# Patient Record
Sex: FEMALE | Race: WHITE | Employment: UNEMPLOYED | URBAN - METROPOLITAN AREA
[De-identification: names, ages, dates, MRNs, and addresses within clinical notes are randomized per-mention and may not be internally consistent; named-entity substitution may affect disease eponyms.]

---

## 2023-05-22 ENCOUNTER — APPOINTMENT (OUTPATIENT)
Dept: CT IMAGING | Age: 33
End: 2023-05-22
Payer: COMMERCIAL

## 2023-05-22 ENCOUNTER — APPOINTMENT (OUTPATIENT)
Dept: GENERAL RADIOLOGY | Age: 33
End: 2023-05-22
Payer: COMMERCIAL

## 2023-05-22 ENCOUNTER — HOSPITAL ENCOUNTER (EMERGENCY)
Age: 33
Discharge: HOME | End: 2023-05-22
Attending: EMERGENCY MEDICINE
Payer: COMMERCIAL

## 2023-05-22 VITALS
SYSTOLIC BLOOD PRESSURE: 107 MMHG | RESPIRATION RATE: 16 BRPM | BODY MASS INDEX: 28.32 KG/M2 | HEIGHT: 65 IN | TEMPERATURE: 97.7 F | OXYGEN SATURATION: 93 % | DIASTOLIC BLOOD PRESSURE: 69 MMHG | HEART RATE: 70 BPM | WEIGHT: 170 LBS

## 2023-05-22 DIAGNOSIS — I95.1 ORTHOSTASIS: ICD-10-CM

## 2023-05-22 DIAGNOSIS — Z76.0 ENCOUNTER FOR MEDICATION REFILL: ICD-10-CM

## 2023-05-22 DIAGNOSIS — R07.9 CHEST PAIN, UNSPECIFIED TYPE: Primary | ICD-10-CM

## 2023-05-22 LAB
ALBUMIN SERPL-MCNC: 4.1 G/DL (ref 3.4–5)
ALBUMIN/GLOB SERPL: 1.5 {RATIO} (ref 1.1–2.2)
ALP SERPL-CCNC: 56 U/L (ref 40–129)
ALT SERPL-CCNC: 11 U/L (ref 10–40)
ANION GAP SERPL CALCULATED.3IONS-SCNC: 10 MMOL/L (ref 3–16)
AST SERPL-CCNC: 13 U/L (ref 15–37)
BASOPHILS # BLD: 0 K/UL (ref 0–0.2)
BASOPHILS NFR BLD: 0.5 %
BILIRUB SERPL-MCNC: 0.4 MG/DL (ref 0–1)
BUN SERPL-MCNC: 11 MG/DL (ref 7–20)
CALCIUM SERPL-MCNC: 9.3 MG/DL (ref 8.3–10.6)
CHLORIDE SERPL-SCNC: 104 MMOL/L (ref 99–110)
CO2 SERPL-SCNC: 25 MMOL/L (ref 21–32)
CREAT SERPL-MCNC: 0.6 MG/DL (ref 0.6–1.1)
DEPRECATED RDW RBC AUTO: 12.8 % (ref 12.4–15.4)
EKG ATRIAL RATE: 90 BPM
EKG DIAGNOSIS: NORMAL
EKG P AXIS: 50 DEGREES
EKG P-R INTERVAL: 146 MS
EKG Q-T INTERVAL: 362 MS
EKG QRS DURATION: 82 MS
EKG QTC CALCULATION (BAZETT): 442 MS
EKG R AXIS: 58 DEGREES
EKG T AXIS: 55 DEGREES
EKG VENTRICULAR RATE: 90 BPM
EOSINOPHIL # BLD: 0.2 K/UL (ref 0–0.6)
EOSINOPHIL NFR BLD: 3.2 %
GFR SERPLBLD CREATININE-BSD FMLA CKD-EPI: >60 ML/MIN/{1.73_M2}
GLUCOSE SERPL-MCNC: 93 MG/DL (ref 70–99)
HCG SERPL QL: NEGATIVE
HCT VFR BLD AUTO: 43.5 % (ref 36–48)
HGB BLD-MCNC: 15 G/DL (ref 12–16)
INR PPP: 0.97 (ref 0.84–1.16)
LYMPHOCYTES # BLD: 1.7 K/UL (ref 1–5.1)
LYMPHOCYTES NFR BLD: 34.1 %
MCH RBC QN AUTO: 31.1 PG (ref 26–34)
MCHC RBC AUTO-ENTMCNC: 34.4 G/DL (ref 31–36)
MCV RBC AUTO: 90.4 FL (ref 80–100)
MONOCYTES # BLD: 0.5 K/UL (ref 0–1.3)
MONOCYTES NFR BLD: 10.8 %
NEUTROPHILS # BLD: 2.6 K/UL (ref 1.7–7.7)
NEUTROPHILS NFR BLD: 51.4 %
NT-PROBNP SERPL-MCNC: <36 PG/ML (ref 0–124)
PLATELET # BLD AUTO: 231 K/UL (ref 135–450)
PMV BLD AUTO: 9 FL (ref 5–10.5)
POTASSIUM SERPL-SCNC: 3.7 MMOL/L (ref 3.5–5.1)
PROT SERPL-MCNC: 6.8 G/DL (ref 6.4–8.2)
PROTHROMBIN TIME: 12.9 SEC (ref 11.5–14.8)
RBC # BLD AUTO: 4.81 M/UL (ref 4–5.2)
SODIUM SERPL-SCNC: 139 MMOL/L (ref 136–145)
TROPONIN, HIGH SENSITIVITY: <6 NG/L (ref 0–14)
TROPONIN, HIGH SENSITIVITY: <6 NG/L (ref 0–14)
WBC # BLD AUTO: 5 K/UL (ref 4–11)

## 2023-05-22 PROCEDURE — 2580000003 HC RX 258: Performed by: EMERGENCY MEDICINE

## 2023-05-22 PROCEDURE — 85610 PROTHROMBIN TIME: CPT

## 2023-05-22 PROCEDURE — 93010 ELECTROCARDIOGRAM REPORT: CPT | Performed by: INTERNAL MEDICINE

## 2023-05-22 PROCEDURE — 6370000000 HC RX 637 (ALT 250 FOR IP): Performed by: EMERGENCY MEDICINE

## 2023-05-22 PROCEDURE — 6370000000 HC RX 637 (ALT 250 FOR IP): Performed by: REGISTERED NURSE

## 2023-05-22 PROCEDURE — 71045 X-RAY EXAM CHEST 1 VIEW: CPT

## 2023-05-22 PROCEDURE — 93005 ELECTROCARDIOGRAM TRACING: CPT | Performed by: EMERGENCY MEDICINE

## 2023-05-22 PROCEDURE — 71260 CT THORAX DX C+: CPT

## 2023-05-22 PROCEDURE — 2580000003 HC RX 258: Performed by: REGISTERED NURSE

## 2023-05-22 PROCEDURE — 83880 ASSAY OF NATRIURETIC PEPTIDE: CPT

## 2023-05-22 PROCEDURE — 36415 COLL VENOUS BLD VENIPUNCTURE: CPT

## 2023-05-22 PROCEDURE — 6360000004 HC RX CONTRAST MEDICATION: Performed by: REGISTERED NURSE

## 2023-05-22 PROCEDURE — 99285 EMERGENCY DEPT VISIT HI MDM: CPT

## 2023-05-22 PROCEDURE — 84703 CHORIONIC GONADOTROPIN ASSAY: CPT

## 2023-05-22 PROCEDURE — 70450 CT HEAD/BRAIN W/O DYE: CPT

## 2023-05-22 PROCEDURE — 85025 COMPLETE CBC W/AUTO DIFF WBC: CPT

## 2023-05-22 PROCEDURE — 80053 COMPREHEN METABOLIC PANEL: CPT

## 2023-05-22 PROCEDURE — 84484 ASSAY OF TROPONIN QUANT: CPT

## 2023-05-22 RX ORDER — RANOLAZINE 1000 MG/1
1000 TABLET, EXTENDED RELEASE ORAL 2 TIMES DAILY
Qty: 60 TABLET | Refills: 0 | Status: SHIPPED | OUTPATIENT
Start: 2023-05-22 | End: 2023-06-21

## 2023-05-22 RX ORDER — ISOSORBIDE MONONITRATE 30 MG/1
30 TABLET, EXTENDED RELEASE ORAL DAILY
Qty: 30 TABLET | Refills: 0 | Status: SHIPPED | OUTPATIENT
Start: 2023-05-22

## 2023-05-22 RX ORDER — ISOSORBIDE MONONITRATE 30 MG/1
30 TABLET, EXTENDED RELEASE ORAL DAILY
Status: DISCONTINUED | OUTPATIENT
Start: 2023-05-22 | End: 2023-05-22 | Stop reason: HOSPADM

## 2023-05-22 RX ORDER — MECLIZINE HCL 25MG 25 MG/1
25 TABLET, CHEWABLE ORAL ONCE
Status: COMPLETED | OUTPATIENT
Start: 2023-05-22 | End: 2023-05-22

## 2023-05-22 RX ORDER — SODIUM CHLORIDE, SODIUM LACTATE, POTASSIUM CHLORIDE, AND CALCIUM CHLORIDE .6; .31; .03; .02 G/100ML; G/100ML; G/100ML; G/100ML
1000 INJECTION, SOLUTION INTRAVENOUS ONCE
Status: COMPLETED | OUTPATIENT
Start: 2023-05-22 | End: 2023-05-22

## 2023-05-22 RX ORDER — NITROGLYCERIN 0.4 MG/1
0.4 TABLET SUBLINGUAL EVERY 5 MIN PRN
Status: DISCONTINUED | OUTPATIENT
Start: 2023-05-22 | End: 2023-05-22 | Stop reason: HOSPADM

## 2023-05-22 RX ORDER — 0.9 % SODIUM CHLORIDE 0.9 %
1000 INTRAVENOUS SOLUTION INTRAVENOUS ONCE
Status: COMPLETED | OUTPATIENT
Start: 2023-05-22 | End: 2023-05-22

## 2023-05-22 RX ORDER — RANOLAZINE 500 MG/1
1000 TABLET, EXTENDED RELEASE ORAL 2 TIMES DAILY
Status: DISCONTINUED | OUTPATIENT
Start: 2023-05-22 | End: 2023-05-22 | Stop reason: HOSPADM

## 2023-05-22 RX ADMIN — NITROGLYCERIN 0.4 MG: 0.4 TABLET SUBLINGUAL at 13:41

## 2023-05-22 RX ADMIN — SODIUM CHLORIDE, POTASSIUM CHLORIDE, SODIUM LACTATE AND CALCIUM CHLORIDE 1000 ML: 600; 310; 30; 20 INJECTION, SOLUTION INTRAVENOUS at 16:12

## 2023-05-22 RX ADMIN — MECLIZINE HYDROCHLORIDE 25 MG: 25 TABLET, CHEWABLE ORAL at 13:41

## 2023-05-22 RX ADMIN — SODIUM CHLORIDE 1000 ML: 9 INJECTION, SOLUTION INTRAVENOUS at 13:44

## 2023-05-22 RX ADMIN — ISOSORBIDE MONONITRATE 30 MG: 30 TABLET, EXTENDED RELEASE ORAL at 16:10

## 2023-05-22 RX ADMIN — IOPAMIDOL 75 ML: 755 INJECTION, SOLUTION INTRAVENOUS at 12:08

## 2023-05-22 ASSESSMENT — PAIN DESCRIPTION - LOCATION: LOCATION: CHEST

## 2023-05-22 ASSESSMENT — PAIN - FUNCTIONAL ASSESSMENT
PAIN_FUNCTIONAL_ASSESSMENT: NONE - DENIES PAIN
PAIN_FUNCTIONAL_ASSESSMENT: 0-10

## 2023-05-22 ASSESSMENT — PAIN DESCRIPTION - ORIENTATION: ORIENTATION: LEFT;UPPER

## 2023-05-22 ASSESSMENT — PAIN DESCRIPTION - ONSET: ONSET: ON-GOING

## 2023-05-22 ASSESSMENT — PAIN DESCRIPTION - FREQUENCY: FREQUENCY: CONTINUOUS

## 2023-05-22 ASSESSMENT — PAIN SCALES - GENERAL: PAINLEVEL_OUTOF10: 5

## 2023-05-22 ASSESSMENT — HEART SCORE: ECG: 0

## 2023-05-22 NOTE — ED PROVIDER NOTES
I independently performed a history and physical on [address]. All diagnostic, treatment, and disposition decisions were made by myself in conjunction with the advanced practice provider. For further details of [address] emergency department encounter, please see Em Butt NP's documentation. Chief Complaint   Patient presents with    Chest Pain     Reports strong cardiac history     Patient reports that she has a significant cardiac history involving a PFO as well as PDA. She also states that at one time she was prescribed Ranexa and Imdur by Dr. Merlene Ott with [address]. She however has stopped taking these medicines but feels she needs them again. She has been dizzy for the last day and having chest pain as well. On exam heart regular rate and rhythm and lungs clear to auscultation bilaterally. EKG  The Ekg interpreted by me shows  normal sinus rhythm with a rate of 90  Axis is   Normal  QTc is  normal  Intervals and Durations are unremarkable. ST Segments: normal  No significant change from prior EKG dated 4/24/2020    Labs Reviewed   COMPREHENSIVE METABOLIC PANEL W/ REFLEX TO MG FOR LOW K - Abnormal; Notable for the following components:       Result Value    AST 13 (*)     All other components within normal limits   CBC WITH AUTO DIFFERENTIAL   TROPONIN   BRAIN NATRIURETIC PEPTIDE   HCG, SERUM, QUALITATIVE   PROTIME-INR   TROPONIN   URINALYSIS WITH MICROSCOPIC   POCT GLUCOSE     CT CHEST PULMONARY EMBOLISM W CONTRAST   Final Result   1. No pulmonary embolism. 2. The lungs are clear with no acute finding in the chest.         CT HEAD WO CONTRAST   Preliminary Result   No evidence of acute intracranial abnormality. XR CHEST PORTABLE   Final Result   No acute cardiopulmonary findings             Point-of-care ultrasound performed by me:  No results found.     I personally saw this patient and performed a substantive portion of the visit including all aspects of
radiologist and myself are no evidence of acute intracranial abnormality. Chest x-ray interpreted by the radiologist and myself are no acute cardiopulmonary findings. Normal cardiomediastinal silhouette. No acute airspace infiltrate and no pneumothorax or pleural effusion. CT chest pulmonary embolism with contrast interpreted by the radiologist for no pulmonary emboli, lungs are clear without acute findings in the chest.  Heart size is normal.  Orthostatic vital signs were collected and revealed positive for orthostasis as evidenced by pulse lying at 67 elevated to 98 bpm while standing. Patient to receive IV fluids. I did place a consult to cardiology and spoke with Dr. Candelaria Lam because of patient's history of PFO and reports of chest pain. He felt that she was reasonable to follow-up as an outpatient as her heart score was low with 2 negative troponins. On reevaluation patient stated that she was feeling much improved after receiving IV fluids. At this time further evaluation in the emergency department is pending and thereby transferred to Dr. Renetta Chung will monitor further evaluation and determine final disposition. Is this patient to be included in the SEP-1 Core Measure due to severe sepsis or septic shock? No   Exclusion criteria - the patient is NOT to be included for SEP-1 Core Measure due to: Infection is not suspected    Discharge Time out:  CC Reviewed Yes   Test Results Yes     Vitals:    05/22/23 1900   BP: 107/69   Pulse: 70   Resp: 16   Temp:    SpO2: 93%              FINAL IMPRESSION      1. Chest pain, unspecified type    2. Orthostasis    3.  Encounter for medication refill          DISPOSITION/PLAN   DISPOSITION Decision To Discharge 05/22/2023 07:38:09 PM      PATIENT REFERREDTO:  Lonnie Sampson MD  [address]  [phone]    Schedule an appointment as soon as possible for a visit in 2 days      Therese Valencia MD  [facility]

## 2023-05-22 NOTE — ED NOTES
1435-Call placed to Cardiology for consult placed by Araseli Mckeon NP. Ouida Sandhoff  05/22/23 1437  1440-Call back received from Dr. Nick Appiah spoke with Araseli Mckeon NP regarding consult.       Ouida Sandhoff  05/22/23 1440

## 2023-05-25 ASSESSMENT — ENCOUNTER SYMPTOMS
CONSTIPATION: 0
SORE THROAT: 0
EYE PAIN: 0
VOMITING: 0
COUGH: 0
DIARRHEA: 0
CHEST TIGHTNESS: 1
ABDOMINAL PAIN: 0
RHINORRHEA: 0
NAUSEA: 0
SHORTNESS OF BREATH: 0
PHOTOPHOBIA: 0
BACK PAIN: 0

## 2023-06-17 ENCOUNTER — HOSPITAL ENCOUNTER (EMERGENCY)
Age: 33
Discharge: HOME | End: 2023-06-18
Attending: STUDENT IN AN ORGANIZED HEALTH CARE EDUCATION/TRAINING PROGRAM
Payer: COMMERCIAL

## 2023-06-17 ENCOUNTER — APPOINTMENT (OUTPATIENT)
Dept: GENERAL RADIOLOGY | Age: 33
End: 2023-06-17
Payer: COMMERCIAL

## 2023-06-17 DIAGNOSIS — R07.9 CHEST PAIN, UNSPECIFIED TYPE: Primary | ICD-10-CM

## 2023-06-17 PROCEDURE — 71045 X-RAY EXAM CHEST 1 VIEW: CPT

## 2023-06-17 PROCEDURE — 93005 ELECTROCARDIOGRAM TRACING: CPT | Performed by: STUDENT IN AN ORGANIZED HEALTH CARE EDUCATION/TRAINING PROGRAM

## 2023-06-17 PROCEDURE — 80053 COMPREHEN METABOLIC PANEL: CPT

## 2023-06-17 PROCEDURE — 99285 EMERGENCY DEPT VISIT HI MDM: CPT

## 2023-06-17 PROCEDURE — 6370000000 HC RX 637 (ALT 250 FOR IP): Performed by: STUDENT IN AN ORGANIZED HEALTH CARE EDUCATION/TRAINING PROGRAM

## 2023-06-17 PROCEDURE — 85025 COMPLETE CBC W/AUTO DIFF WBC: CPT

## 2023-06-17 PROCEDURE — 36415 COLL VENOUS BLD VENIPUNCTURE: CPT

## 2023-06-17 PROCEDURE — 84484 ASSAY OF TROPONIN QUANT: CPT

## 2023-06-17 PROCEDURE — 83880 ASSAY OF NATRIURETIC PEPTIDE: CPT

## 2023-06-17 RX ORDER — ISOSORBIDE MONONITRATE 30 MG/1
30 TABLET, EXTENDED RELEASE ORAL ONCE
Status: COMPLETED | OUTPATIENT
Start: 2023-06-17 | End: 2023-06-17

## 2023-06-17 RX ORDER — RANOLAZINE 500 MG/1
1000 TABLET, EXTENDED RELEASE ORAL ONCE
Status: COMPLETED | OUTPATIENT
Start: 2023-06-17 | End: 2023-06-17

## 2023-06-17 RX ADMIN — ISOSORBIDE MONONITRATE 30 MG: 30 TABLET, EXTENDED RELEASE ORAL at 23:36

## 2023-06-17 RX ADMIN — RANOLAZINE 1000 MG: 500 TABLET, FILM COATED, EXTENDED RELEASE ORAL at 23:36

## 2023-06-18 VITALS
WEIGHT: 182.2 LBS | SYSTOLIC BLOOD PRESSURE: 96 MMHG | DIASTOLIC BLOOD PRESSURE: 52 MMHG | RESPIRATION RATE: 21 BRPM | TEMPERATURE: 97.6 F | BODY MASS INDEX: 30.35 KG/M2 | HEART RATE: 82 BPM | HEIGHT: 65 IN | OXYGEN SATURATION: 96 %

## 2023-06-18 LAB
ALBUMIN SERPL-MCNC: 4.5 G/DL (ref 3.4–5)
ALBUMIN/GLOB SERPL: 1.5 {RATIO} (ref 1.1–2.2)
ALP SERPL-CCNC: 65 U/L (ref 40–129)
ALT SERPL-CCNC: 21 U/L (ref 10–40)
ANION GAP SERPL CALCULATED.3IONS-SCNC: 7 MMOL/L (ref 3–16)
AST SERPL-CCNC: 18 U/L (ref 15–37)
BASOPHILS # BLD: 0 K/UL (ref 0–0.2)
BASOPHILS NFR BLD: 0.3 %
BILIRUB SERPL-MCNC: <0.2 MG/DL (ref 0–1)
BUN SERPL-MCNC: 15 MG/DL (ref 7–20)
CALCIUM SERPL-MCNC: 9.7 MG/DL (ref 8.3–10.6)
CHLORIDE SERPL-SCNC: 99 MMOL/L (ref 99–110)
CO2 SERPL-SCNC: 27 MMOL/L (ref 21–32)
CREAT SERPL-MCNC: 0.7 MG/DL (ref 0.6–1.1)
DEPRECATED RDW RBC AUTO: 13.8 % (ref 12.4–15.4)
EKG ATRIAL RATE: 87 BPM
EKG DIAGNOSIS: NORMAL
EKG P AXIS: 52 DEGREES
EKG P-R INTERVAL: 152 MS
EKG Q-T INTERVAL: 356 MS
EKG QRS DURATION: 82 MS
EKG QTC CALCULATION (BAZETT): 428 MS
EKG R AXIS: 56 DEGREES
EKG T AXIS: 53 DEGREES
EKG VENTRICULAR RATE: 87 BPM
EOSINOPHIL # BLD: 0.2 K/UL (ref 0–0.6)
EOSINOPHIL NFR BLD: 2.6 %
GFR SERPLBLD CREATININE-BSD FMLA CKD-EPI: >60 ML/MIN/{1.73_M2}
GLUCOSE SERPL-MCNC: 96 MG/DL (ref 70–99)
HCT VFR BLD AUTO: 43.1 % (ref 36–48)
HGB BLD-MCNC: 14.8 G/DL (ref 12–16)
LYMPHOCYTES # BLD: 2.5 K/UL (ref 1–5.1)
LYMPHOCYTES NFR BLD: 30.1 %
MCH RBC QN AUTO: 31.2 PG (ref 26–34)
MCHC RBC AUTO-ENTMCNC: 34.3 G/DL (ref 31–36)
MCV RBC AUTO: 90.9 FL (ref 80–100)
MONOCYTES # BLD: 0.8 K/UL (ref 0–1.3)
MONOCYTES NFR BLD: 9.6 %
NEUTROPHILS # BLD: 4.9 K/UL (ref 1.7–7.7)
NEUTROPHILS NFR BLD: 57.4 %
NT-PROBNP SERPL-MCNC: <36 PG/ML (ref 0–124)
PLATELET # BLD AUTO: 260 K/UL (ref 135–450)
PMV BLD AUTO: 8.7 FL (ref 5–10.5)
POTASSIUM SERPL-SCNC: 3.9 MMOL/L (ref 3.5–5.1)
PROT SERPL-MCNC: 7.6 G/DL (ref 6.4–8.2)
RBC # BLD AUTO: 4.75 M/UL (ref 4–5.2)
SODIUM SERPL-SCNC: 133 MMOL/L (ref 136–145)
TROPONIN, HIGH SENSITIVITY: <6 NG/L (ref 0–14)
TROPONIN, HIGH SENSITIVITY: <6 NG/L (ref 0–14)
WBC # BLD AUTO: 8.5 K/UL (ref 4–11)

## 2023-06-18 PROCEDURE — 93010 ELECTROCARDIOGRAM REPORT: CPT | Performed by: INTERNAL MEDICINE

## 2023-06-18 PROCEDURE — 84484 ASSAY OF TROPONIN QUANT: CPT

## 2023-06-18 PROCEDURE — 36415 COLL VENOUS BLD VENIPUNCTURE: CPT

## 2023-06-18 RX ORDER — RANOLAZINE 1000 MG/1
1000 TABLET, EXTENDED RELEASE ORAL 2 TIMES DAILY
Qty: 60 TABLET | Refills: 0 | Status: SHIPPED | OUTPATIENT
Start: 2023-06-18 | End: 2023-07-18

## 2023-06-18 RX ORDER — ISOSORBIDE MONONITRATE 30 MG/1
30 TABLET, EXTENDED RELEASE ORAL DAILY
Qty: 30 TABLET | Refills: 0 | Status: SHIPPED | OUTPATIENT
Start: 2023-06-18 | End: 2023-07-18

## 2023-06-18 ASSESSMENT — HEART SCORE: ECG: 0

## 2023-06-18 ASSESSMENT — PAIN SCALES - GENERAL: PAINLEVEL_OUTOF10: 4

## 2023-06-18 ASSESSMENT — LIFESTYLE VARIABLES
HOW MANY STANDARD DRINKS CONTAINING ALCOHOL DO YOU HAVE ON A TYPICAL DAY: PATIENT DOES NOT DRINK
HOW OFTEN DO YOU HAVE A DRINK CONTAINING ALCOHOL: NEVER

## 2023-10-21 PROCEDURE — 99283 EMERGENCY DEPT VISIT LOW MDM: CPT

## 2023-10-22 ENCOUNTER — HOSPITAL ENCOUNTER (EMERGENCY)
Age: 33
Discharge: HOME | End: 2023-10-22
Attending: EMERGENCY MEDICINE
Payer: COMMERCIAL

## 2023-10-22 VITALS
WEIGHT: 176 LBS | BODY MASS INDEX: 30.05 KG/M2 | TEMPERATURE: 97.1 F | OXYGEN SATURATION: 98 % | DIASTOLIC BLOOD PRESSURE: 80 MMHG | SYSTOLIC BLOOD PRESSURE: 122 MMHG | RESPIRATION RATE: 18 BRPM | HEART RATE: 89 BPM | HEIGHT: 64 IN

## 2023-10-22 DIAGNOSIS — H60.311 ACUTE DIFFUSE OTITIS EXTERNA OF RIGHT EAR: Primary | ICD-10-CM

## 2023-10-22 DIAGNOSIS — J06.9 VIRAL URI: ICD-10-CM

## 2023-10-22 PROCEDURE — 6370000000 HC RX 637 (ALT 250 FOR IP): Performed by: EMERGENCY MEDICINE

## 2023-10-22 RX ORDER — FLUTICASONE PROPIONATE 50 MCG
2 SPRAY, SUSPENSION (ML) NASAL DAILY
Qty: 16 G | Refills: 0 | Status: SHIPPED | OUTPATIENT
Start: 2023-10-22

## 2023-10-22 RX ORDER — OXYMETAZOLINE HYDROCHLORIDE 0.05 G/100ML
2 SPRAY NASAL 2 TIMES DAILY
Qty: 2 ML | Refills: 0 | Status: SHIPPED | OUTPATIENT
Start: 2023-10-22 | End: 2023-10-27

## 2023-10-22 RX ORDER — LITHIUM CARBONATE 300 MG/1
300 CAPSULE ORAL
COMMUNITY
Start: 2023-02-27

## 2023-10-22 RX ORDER — CIPROFLOXACIN AND DEXAMETHASONE 3; 1 MG/ML; MG/ML
4 SUSPENSION/ DROPS AURICULAR (OTIC) ONCE
Status: COMPLETED | OUTPATIENT
Start: 2023-10-22 | End: 2023-10-22

## 2023-10-22 RX ORDER — CIPROFLOXACIN AND DEXAMETHASONE 3; 1 MG/ML; MG/ML
4 SUSPENSION/ DROPS AURICULAR (OTIC) 2 TIMES DAILY
Qty: 7.5 ML | Refills: 0 | Status: SHIPPED | OUTPATIENT
Start: 2023-10-22 | End: 2023-10-29

## 2023-10-22 RX ADMIN — CIPROFLOXACIN AND DEXAMETHASONE 4 DROP: 3; 1 SUSPENSION/ DROPS AURICULAR (OTIC) at 03:27

## 2023-10-22 ASSESSMENT — PAIN DESCRIPTION - LOCATION: LOCATION: EAR

## 2023-10-22 ASSESSMENT — PAIN DESCRIPTION - FREQUENCY: FREQUENCY: INTERMITTENT

## 2023-10-22 ASSESSMENT — PAIN - FUNCTIONAL ASSESSMENT
PAIN_FUNCTIONAL_ASSESSMENT: NONE - DENIES PAIN
PAIN_FUNCTIONAL_ASSESSMENT: ACTIVITIES ARE NOT PREVENTED
PAIN_FUNCTIONAL_ASSESSMENT: 0-10

## 2023-10-22 ASSESSMENT — PAIN DESCRIPTION - ORIENTATION: ORIENTATION: RIGHT

## 2023-10-22 ASSESSMENT — PAIN SCALES - GENERAL: PAINLEVEL_OUTOF10: 10

## 2023-10-23 ASSESSMENT — ENCOUNTER SYMPTOMS
ALLERGIC/IMMUNOLOGIC NEGATIVE: 1
GASTROINTESTINAL NEGATIVE: 1
RESPIRATORY NEGATIVE: 1
FACIAL SWELLING: 0
EYES NEGATIVE: 1
SINUS PRESSURE: 0
SINUS PAIN: 0
SORE THROAT: 0
RHINORRHEA: 1
VOICE CHANGE: 0
TROUBLE SWALLOWING: 0

## 2024-04-21 ENCOUNTER — HOSPITAL ENCOUNTER (EMERGENCY)
Age: 34
Discharge: HOME | End: 2024-04-21
Payer: COMMERCIAL

## 2024-04-21 VITALS
TEMPERATURE: 97 F | OXYGEN SATURATION: 98 % | BODY MASS INDEX: 29.64 KG/M2 | HEIGHT: 64 IN | HEART RATE: 80 BPM | SYSTOLIC BLOOD PRESSURE: 99 MMHG | DIASTOLIC BLOOD PRESSURE: 64 MMHG | WEIGHT: 173.6 LBS | RESPIRATION RATE: 18 BRPM

## 2024-04-21 DIAGNOSIS — G43.909 MIGRAINE WITHOUT STATUS MIGRAINOSUS, NOT INTRACTABLE, UNSPECIFIED MIGRAINE TYPE: Primary | ICD-10-CM

## 2024-04-21 PROCEDURE — 96372 THER/PROPH/DIAG INJ SC/IM: CPT

## 2024-04-21 PROCEDURE — 6360000002 HC RX W HCPCS: Performed by: PHYSICIAN ASSISTANT

## 2024-04-21 PROCEDURE — 2580000003 HC RX 258: Performed by: PHYSICIAN ASSISTANT

## 2024-04-21 PROCEDURE — 96375 TX/PRO/DX INJ NEW DRUG ADDON: CPT

## 2024-04-21 PROCEDURE — 96374 THER/PROPH/DIAG INJ IV PUSH: CPT

## 2024-04-21 PROCEDURE — 99284 EMERGENCY DEPT VISIT MOD MDM: CPT

## 2024-04-21 RX ORDER — DIPHENHYDRAMINE HYDROCHLORIDE 50 MG/ML
25 INJECTION INTRAMUSCULAR; INTRAVENOUS ONCE
Status: COMPLETED | OUTPATIENT
Start: 2024-04-21 | End: 2024-04-21

## 2024-04-21 RX ORDER — DEXAMETHASONE SODIUM PHOSPHATE 10 MG/ML
4 INJECTION, SOLUTION INTRAMUSCULAR; INTRAVENOUS ONCE
Status: COMPLETED | OUTPATIENT
Start: 2024-04-21 | End: 2024-04-21

## 2024-04-21 RX ORDER — ORPHENADRINE CITRATE 30 MG/ML
60 INJECTION INTRAMUSCULAR; INTRAVENOUS ONCE
Status: COMPLETED | OUTPATIENT
Start: 2024-04-21 | End: 2024-04-21

## 2024-04-21 RX ORDER — KETOROLAC TROMETHAMINE 15 MG/ML
15 INJECTION, SOLUTION INTRAMUSCULAR; INTRAVENOUS ONCE
Status: COMPLETED | OUTPATIENT
Start: 2024-04-21 | End: 2024-04-21

## 2024-04-21 RX ORDER — PROCHLORPERAZINE EDISYLATE 5 MG/ML
10 INJECTION INTRAMUSCULAR; INTRAVENOUS ONCE
Status: COMPLETED | OUTPATIENT
Start: 2024-04-21 | End: 2024-04-21

## 2024-04-21 RX ORDER — 0.9 % SODIUM CHLORIDE 0.9 %
1000 INTRAVENOUS SOLUTION INTRAVENOUS ONCE
Status: COMPLETED | OUTPATIENT
Start: 2024-04-21 | End: 2024-04-21

## 2024-04-21 RX ADMIN — SODIUM CHLORIDE 1000 ML: 9 INJECTION, SOLUTION INTRAVENOUS at 13:17

## 2024-04-21 RX ADMIN — PROCHLORPERAZINE EDISYLATE 10 MG: 5 INJECTION INTRAMUSCULAR; INTRAVENOUS at 13:19

## 2024-04-21 RX ADMIN — DIPHENHYDRAMINE HYDROCHLORIDE 25 MG: 50 INJECTION, SOLUTION INTRAMUSCULAR; INTRAVENOUS at 13:23

## 2024-04-21 RX ADMIN — ORPHENADRINE CITRATE 60 MG: 60 INJECTION INTRAMUSCULAR; INTRAVENOUS at 13:20

## 2024-04-21 RX ADMIN — DEXAMETHASONE SODIUM PHOSPHATE 4 MG: 10 INJECTION, SOLUTION INTRAMUSCULAR; INTRAVENOUS at 13:24

## 2024-04-21 RX ADMIN — KETOROLAC TROMETHAMINE 15 MG: 15 INJECTION, SOLUTION INTRAMUSCULAR; INTRAVENOUS at 13:22

## 2024-04-21 ASSESSMENT — PAIN DESCRIPTION - ORIENTATION: ORIENTATION: LEFT

## 2024-04-21 ASSESSMENT — PAIN DESCRIPTION - PAIN TYPE: TYPE: ACUTE PAIN

## 2024-04-21 ASSESSMENT — LIFESTYLE VARIABLES
HOW OFTEN DO YOU HAVE A DRINK CONTAINING ALCOHOL: NEVER
HOW MANY STANDARD DRINKS CONTAINING ALCOHOL DO YOU HAVE ON A TYPICAL DAY: PATIENT DOES NOT DRINK

## 2024-04-21 ASSESSMENT — PAIN DESCRIPTION - LOCATION
LOCATION: HEAD
LOCATION: HEAD

## 2024-04-21 ASSESSMENT — PAIN DESCRIPTION - DESCRIPTORS: DESCRIPTORS: THROBBING

## 2024-04-21 ASSESSMENT — PAIN SCALES - GENERAL
PAINLEVEL_OUTOF10: 10
PAINLEVEL_OUTOF10: 4

## 2024-04-21 ASSESSMENT — PAIN - FUNCTIONAL ASSESSMENT
PAIN_FUNCTIONAL_ASSESSMENT: 0-10
PAIN_FUNCTIONAL_ASSESSMENT: 0-10

## 2024-04-21 NOTE — ED PROVIDER NOTES
jugular venous distension.  CHEST:  Full symmetrical respiratory excursions.  No retractions. No use of accessory muscles of breathing.  No deformity seen.   HEART/CARDIOVASCULAR:  No lower extremity edema seen.  No cyanosis seen.    ABDOMEN:  Normal contour visualized.    MUSCULOSKELETAL:  No visible abnormalities. No external evidence of trauma. Joints have full active range of motion.  PERIPERIAL NEUROVASCULAR:  Normal color and warmth of the extremities. No clubbing, cyanosis, or petechiae.  No pitting edema noted in the upper or lower extremities.  Distal neurovascular function normal in all fields. Equal and symmetrical arterial pulses bilaterally. Capillary refill brisk.  EXTREMITIES: No external evidence of recent trauma.  Moves all extremities purposefully.  Distal neurovascular function normal in all fields.  NEURO:  Patient alert, oriented to person, place, time, and event.  No meningeal irritation. Cranial nerves II-XII grossly intact. Bilateral strength equal with normal motor. Gross sensory function intact in all fields. Able to ambulate with normal gait.  PSYCH: Alert and oriented to person, place, and time.  Patient was pleasant, and maintains good eye contact.  Speech was clear and articulate, with normal thought and cognitive processes, without affectation or pressured speech.  Mood and affect were appropriate.  Concentration appeared normal.       DIAGNOSTIC RESULTS   LABS:    Labs Reviewed - No data to display    When ordered only abnormal lab results are displayed. All other labs were within normal range or not returned as of this dictation.    EKG: When ordered, EKG's are interpreted by the Emergency Department Physician in the absence of a cardiologist.  Please see their note for interpretation of EKG.    RADIOLOGY:   Non-plain film images such as CT, Ultrasound and MRI are read by the radiologist. Plain radiographic images are visualized and preliminarily interpreted by the ED Provider with

## 2024-04-21 NOTE — DISCHARGE INSTRUCTIONS
Up with family doctor.  Call first thing Monday morning to set up follow-up appointment.  Return immediately for any new complaints or worsening.

## 2024-05-08 ENCOUNTER — HOSPITAL ENCOUNTER (EMERGENCY)
Age: 34
Discharge: HOME | DRG: 204 | End: 2024-05-08
Attending: STUDENT IN AN ORGANIZED HEALTH CARE EDUCATION/TRAINING PROGRAM
Payer: COMMERCIAL

## 2024-05-08 ENCOUNTER — APPOINTMENT (OUTPATIENT)
Dept: GENERAL RADIOLOGY | Age: 34
DRG: 204 | End: 2024-05-08
Payer: COMMERCIAL

## 2024-05-08 VITALS
HEART RATE: 76 BPM | TEMPERATURE: 98.2 F | WEIGHT: 182.4 LBS | DIASTOLIC BLOOD PRESSURE: 69 MMHG | RESPIRATION RATE: 16 BRPM | HEIGHT: 65 IN | SYSTOLIC BLOOD PRESSURE: 118 MMHG | BODY MASS INDEX: 30.39 KG/M2 | OXYGEN SATURATION: 96 %

## 2024-05-08 DIAGNOSIS — R42 INTERMITTENT LIGHTHEADEDNESS: Primary | ICD-10-CM

## 2024-05-08 DIAGNOSIS — R06.02 SHORTNESS OF BREATH: ICD-10-CM

## 2024-05-08 LAB
ALBUMIN SERPL-MCNC: 4.2 G/DL (ref 3.4–5)
ALBUMIN/GLOB SERPL: 1.4 {RATIO} (ref 1.1–2.2)
ALP SERPL-CCNC: 71 U/L (ref 40–129)
ALT SERPL-CCNC: 40 U/L (ref 10–40)
AMPHETAMINES UR QL SCN>1000 NG/ML: NORMAL
ANION GAP SERPL CALCULATED.3IONS-SCNC: 9 MMOL/L (ref 3–16)
AST SERPL-CCNC: 30 U/L (ref 15–37)
BARBITURATES UR QL SCN>200 NG/ML: NORMAL
BASE EXCESS BLDV CALC-SCNC: -3.5 MMOL/L (ref -3–3)
BASOPHILS # BLD: 0 K/UL (ref 0–0.2)
BASOPHILS NFR BLD: 0.5 %
BENZODIAZ UR QL SCN>200 NG/ML: NORMAL
BILIRUB SERPL-MCNC: <0.2 MG/DL (ref 0–1)
BILIRUB UR QL STRIP.AUTO: NEGATIVE
BUN SERPL-MCNC: 13 MG/DL (ref 7–20)
CALCIUM SERPL-MCNC: 9.2 MG/DL (ref 8.3–10.6)
CANNABINOIDS UR QL SCN>50 NG/ML: NORMAL
CHLORIDE SERPL-SCNC: 105 MMOL/L (ref 99–110)
CLARITY UR: CLEAR
CO2 BLDV-SCNC: 23 MMOL/L
CO2 SERPL-SCNC: 26 MMOL/L (ref 21–32)
COCAINE UR QL SCN: NORMAL
COHGB MFR BLDV: 1.3 % (ref 0–1.5)
COLOR UR: YELLOW
CREAT SERPL-MCNC: 0.6 MG/DL (ref 0.6–1.1)
D DIMER: 0.52 UG/ML FEU (ref 0–0.6)
DEPRECATED RDW RBC AUTO: 13.1 % (ref 12.4–15.4)
DRUG SCREEN COMMENT UR-IMP: NORMAL
EKG ATRIAL RATE: 111 BPM
EKG DIAGNOSIS: NORMAL
EKG P AXIS: 59 DEGREES
EKG P-R INTERVAL: 142 MS
EKG Q-T INTERVAL: 320 MS
EKG QRS DURATION: 80 MS
EKG QTC CALCULATION (BAZETT): 435 MS
EKG R AXIS: 67 DEGREES
EKG T AXIS: 58 DEGREES
EKG VENTRICULAR RATE: 111 BPM
EOSINOPHIL # BLD: 0.2 K/UL (ref 0–0.6)
EOSINOPHIL NFR BLD: 2 %
ETHANOLAMINE SERPL-MCNC: NORMAL MG/DL (ref 0–0.08)
FENTANYL SCREEN, URINE: NORMAL
FLUAV RNA RESP QL NAA+PROBE: NOT DETECTED
FLUBV RNA RESP QL NAA+PROBE: NOT DETECTED
GFR SERPLBLD CREATININE-BSD FMLA CKD-EPI: >90 ML/MIN/{1.73_M2}
GLUCOSE SERPL-MCNC: 102 MG/DL (ref 70–99)
GLUCOSE UR STRIP.AUTO-MCNC: NEGATIVE MG/DL
HCO3 BLDV-SCNC: 21.7 MMOL/L (ref 23–29)
HCT VFR BLD AUTO: 44.7 % (ref 36–48)
HGB BLD-MCNC: 15.1 G/DL (ref 12–16)
HGB UR QL STRIP.AUTO: NEGATIVE
KETONES UR STRIP.AUTO-MCNC: NEGATIVE MG/DL
LACTATE BLDV-SCNC: 1.2 MMOL/L (ref 0.4–1.9)
LEUKOCYTE ESTERASE UR QL STRIP.AUTO: NEGATIVE
LYMPHOCYTES # BLD: 2.9 K/UL (ref 1–5.1)
LYMPHOCYTES NFR BLD: 30 %
MCH RBC QN AUTO: 30.8 PG (ref 26–34)
MCHC RBC AUTO-ENTMCNC: 33.8 G/DL (ref 31–36)
MCV RBC AUTO: 91.2 FL (ref 80–100)
METHADONE UR QL SCN>300 NG/ML: NORMAL
METHGB MFR BLDV: 0.3 %
MONOCYTES # BLD: 0.8 K/UL (ref 0–1.3)
MONOCYTES NFR BLD: 8 %
NEUTROPHILS # BLD: 5.8 K/UL (ref 1.7–7.7)
NEUTROPHILS NFR BLD: 59.5 %
NITRITE UR QL STRIP.AUTO: NEGATIVE
NT-PROBNP SERPL-MCNC: <36 PG/ML (ref 0–124)
O2 THERAPY: ABNORMAL
OPIATES UR QL SCN>300 NG/ML: NORMAL
OXYCODONE UR QL SCN: NORMAL
PCO2 BLDV: 39.8 MMHG (ref 40–50)
PCP UR QL SCN>25 NG/ML: NORMAL
PH BLDV: 7.35 [PH] (ref 7.35–7.45)
PH UR STRIP.AUTO: 6 [PH] (ref 5–8)
PH UR STRIP: 6 [PH]
PLATELET # BLD AUTO: 284 K/UL (ref 135–450)
PMV BLD AUTO: 8.9 FL (ref 5–10.5)
PO2 BLDV: 63.6 MMHG (ref 25–40)
POTASSIUM SERPL-SCNC: 4.1 MMOL/L (ref 3.5–5.1)
PROT SERPL-MCNC: 7.3 G/DL (ref 6.4–8.2)
PROT UR STRIP.AUTO-MCNC: NEGATIVE MG/DL
RBC # BLD AUTO: 4.9 M/UL (ref 4–5.2)
SAO2 % BLDV: 91 %
SARS-COV-2 RNA RESP QL NAA+PROBE: NOT DETECTED
SODIUM SERPL-SCNC: 140 MMOL/L (ref 136–145)
SP GR UR STRIP.AUTO: 1.02 (ref 1–1.03)
TROPONIN, HIGH SENSITIVITY: <6 NG/L (ref 0–14)
TROPONIN, HIGH SENSITIVITY: <6 NG/L (ref 0–14)
UA COMPLETE W REFLEX CULTURE PNL UR: NORMAL
UA DIPSTICK W REFLEX MICRO PNL UR: NORMAL
URN SPEC COLLECT METH UR: NORMAL
UROBILINOGEN UR STRIP-ACNC: 0.2 E.U./DL
WBC # BLD AUTO: 9.7 K/UL (ref 4–11)

## 2024-05-08 PROCEDURE — 2580000003 HC RX 258: Performed by: STUDENT IN AN ORGANIZED HEALTH CARE EDUCATION/TRAINING PROGRAM

## 2024-05-08 PROCEDURE — 83880 ASSAY OF NATRIURETIC PEPTIDE: CPT

## 2024-05-08 PROCEDURE — 93010 ELECTROCARDIOGRAM REPORT: CPT | Performed by: STUDENT IN AN ORGANIZED HEALTH CARE EDUCATION/TRAINING PROGRAM

## 2024-05-08 PROCEDURE — 80307 DRUG TEST PRSMV CHEM ANLYZR: CPT

## 2024-05-08 PROCEDURE — 82803 BLOOD GASES ANY COMBINATION: CPT

## 2024-05-08 PROCEDURE — 85025 COMPLETE CBC W/AUTO DIFF WBC: CPT

## 2024-05-08 PROCEDURE — 73552 X-RAY EXAM OF FEMUR 2/>: CPT

## 2024-05-08 PROCEDURE — 87636 SARSCOV2 & INF A&B AMP PRB: CPT

## 2024-05-08 PROCEDURE — 93005 ELECTROCARDIOGRAM TRACING: CPT | Performed by: STUDENT IN AN ORGANIZED HEALTH CARE EDUCATION/TRAINING PROGRAM

## 2024-05-08 PROCEDURE — 85379 FIBRIN DEGRADATION QUANT: CPT

## 2024-05-08 PROCEDURE — 81003 URINALYSIS AUTO W/O SCOPE: CPT

## 2024-05-08 PROCEDURE — 80053 COMPREHEN METABOLIC PANEL: CPT

## 2024-05-08 PROCEDURE — 99285 EMERGENCY DEPT VISIT HI MDM: CPT

## 2024-05-08 PROCEDURE — 36415 COLL VENOUS BLD VENIPUNCTURE: CPT

## 2024-05-08 PROCEDURE — 84484 ASSAY OF TROPONIN QUANT: CPT

## 2024-05-08 PROCEDURE — 71046 X-RAY EXAM CHEST 2 VIEWS: CPT

## 2024-05-08 PROCEDURE — 6370000000 HC RX 637 (ALT 250 FOR IP): Performed by: STUDENT IN AN ORGANIZED HEALTH CARE EDUCATION/TRAINING PROGRAM

## 2024-05-08 PROCEDURE — 6360000002 HC RX W HCPCS: Performed by: STUDENT IN AN ORGANIZED HEALTH CARE EDUCATION/TRAINING PROGRAM

## 2024-05-08 PROCEDURE — 83605 ASSAY OF LACTIC ACID: CPT

## 2024-05-08 PROCEDURE — 82077 ASSAY SPEC XCP UR&BREATH IA: CPT

## 2024-05-08 RX ORDER — LEVALBUTEROL 1.25 MG/.5ML
0.63 SOLUTION, CONCENTRATE RESPIRATORY (INHALATION) ONCE
Status: COMPLETED | OUTPATIENT
Start: 2024-05-08 | End: 2024-05-08

## 2024-05-08 RX ORDER — SODIUM CHLORIDE 0.9 % (FLUSH) 0.9 %
5-40 SYRINGE (ML) INJECTION PRN
Status: DISCONTINUED | OUTPATIENT
Start: 2024-05-08 | End: 2024-05-08 | Stop reason: HOSPADM

## 2024-05-08 RX ORDER — 0.9 % SODIUM CHLORIDE 0.9 %
500 INTRAVENOUS SOLUTION INTRAVENOUS ONCE
Status: DISCONTINUED | OUTPATIENT
Start: 2024-05-08 | End: 2024-05-08

## 2024-05-08 RX ORDER — LANOLIN ALCOHOL/MO/W.PET/CERES
100 CREAM (GRAM) TOPICAL DAILY
Status: DISCONTINUED | OUTPATIENT
Start: 2024-05-08 | End: 2024-05-08 | Stop reason: HOSPADM

## 2024-05-08 RX ORDER — 0.9 % SODIUM CHLORIDE 0.9 %
1000 INTRAVENOUS SOLUTION INTRAVENOUS ONCE
Status: COMPLETED | OUTPATIENT
Start: 2024-05-08 | End: 2024-05-08

## 2024-05-08 RX ORDER — M-VIT,TX,IRON,MINS/CALC/FOLIC 27MG-0.4MG
1 TABLET ORAL DAILY
Status: DISCONTINUED | OUTPATIENT
Start: 2024-05-08 | End: 2024-05-08 | Stop reason: HOSPADM

## 2024-05-08 RX ORDER — SODIUM CHLORIDE 0.9 % (FLUSH) 0.9 %
5-40 SYRINGE (ML) INJECTION EVERY 12 HOURS SCHEDULED
Status: DISCONTINUED | OUTPATIENT
Start: 2024-05-08 | End: 2024-05-08 | Stop reason: HOSPADM

## 2024-05-08 RX ORDER — SODIUM CHLORIDE 9 MG/ML
INJECTION, SOLUTION INTRAVENOUS PRN
Status: DISCONTINUED | OUTPATIENT
Start: 2024-05-08 | End: 2024-05-08 | Stop reason: HOSPADM

## 2024-05-08 RX ADMIN — I-VITE, TAB 1000-60-2MG (60/BT) 1 TABLET: TAB at 17:07

## 2024-05-08 RX ADMIN — LEVALBUTEROL 0.63 MG: 1.25 SOLUTION, CONCENTRATE RESPIRATORY (INHALATION) at 17:10

## 2024-05-08 RX ADMIN — Medication 100 MG: at 17:07

## 2024-05-08 RX ADMIN — SODIUM CHLORIDE 1000 ML: 9 INJECTION, SOLUTION INTRAVENOUS at 16:48

## 2024-05-08 ASSESSMENT — PAIN - FUNCTIONAL ASSESSMENT: PAIN_FUNCTIONAL_ASSESSMENT: NONE - DENIES PAIN

## 2024-05-08 NOTE — ED PROVIDER NOTES
[facility] ED  EMERGENCY DEPARTMENT ENCOUNTER        Patient Name: Roxanne Nicholas  MRN: [id number]  Birthdate 1990  Date of evaluation: 2024  Provider: Andreea Curiel MD  PCP: Timur Shea MD  Note Started: 3:56 PM EDT 24      CHIEF COMPLAINT  Shortness of breath         HISTORY & PHYSICAL     HISTORY OF PRESENT ILLNESS  History from : Patient    Limitations to history : None    Roxanne Nicholas is a 33 y.o. female  has a past medical history of Alpha-1-antitrypsin deficiency (HCC), Asthma, Bronchopulmonary dysplasia of , Heart murmur, History of gallstones, and PFO (patent foramen ovale)., who presents to the ED complaining of shortness of breath. In 2018, started with similar symptoms and ended up in ICU fr respiratory failure after being sent home from ED. States BP was low at home.    Onset: today  Chest pain: denies  Lightheadedness: yes  Shortness of breath: yes  Palliative Factors: denies  Provocative Factors: exertion  Cough: denies  Fever: denies    Denies history of blood clots or active malignancy.  Patient denies unilateral leg swelling, hemoptysis, recent travel or surgery/immobilization, or OCP or other hormone use. Patient is not post partum.    Family history:   No family history on file.    Patient does smoke. 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  Patient denies cocaine or other drug use. Usually drinks 3 shots per night but has not in the past week.     Fell down stairs earlier (due to polyneuropathy and weakness on left side). Happened ~11a. Did not hit head, no LOC, no blood thinners.  She denies new numbness, weakness, tingling.  She denies any vertiginous symptoms.      Old records reviewed:   Echo 10/2018  Summary:   The left ventricular wall motion is normal.   Overall left ventricular ejection fraction is estimated to be 50-55%.   Doppler suggests right to left interatrial shunt.   The atrial septal defect is small.   No significant valvular  [Sulfamethoxazole-Trimethoprim] Nausea Only    Penicillins Hives       REVIEW OF SYSTEMS  All systems reviewed, pertinent positives per HPI otherwise noted to be negative.    PHYSICAL EXAM  ED Triage Vitals [05/08/24 1542]   BP Temp Temp src Pulse Respirations SpO2 Height Weight - Scale   118/85 98.2 °F (36.8 °C) -- (!) 121 22 96 % 1.651 m (5' 5\") 82.7 kg (182 lb 6.4 oz)     GENERAL APPEARANCE: Awake and alert. Cooperative. no distress.  HENT: Normocephalic. Atraumatic. Mucous membranes are moist.  No septal hematoma, blood in the oropharynx, hemotympanums.  NECK: Supple.  Full range of motion of the neck without stiffness or pain.  No cervical spine tenderness to palpation.  EYES: PERRL. EOM's grossly intact.  HEART/CHEST: RR, tachycardia. No murmurs.  Chest wall is not tender to palpation.  LUNGS: Respirations unlabored. CTAB. Good air exchange. Speaking comfortably in full sentences.   ABDOMEN: No tenderness. Soft. Non-distended. No masses. No organomegaly. No guarding or rebound.   MUSCULOSKELETAL: No extremity edema, lower extremities are equal bilaterally and nontender to palpation. Compartments soft.  No deformity.  Mild tenderness to the left femur where she has a bruise.  No tenderness in the extremities.  All extremities neurovascularly intact.  No thoracic or lumbar spinal tenderness to palpation.  Pelvis stable and nontender.  SKIN: Warm and dry. No acute rashes.  Bruise to the left thigh.  NEUROLOGICAL: Alert and oriented.  No gross facial drooping. Strength 5/5, sensation intact.  NIH stroke scale of 0.  PSYCHIATRIC: Normal mood and affect.      WORKUP     LABS  I have reviewed all labs for this visit.   Results for orders placed or performed during the hospital encounter of 05/08/24   CBC with Auto Differential   Result Value Ref Range    WBC 9.7 4.0 - 11.0 K/uL    RBC 4.90 4.00 - 5.20 M/uL    Hemoglobin 15.1 12.0 - 16.0 g/dL    Hematocrit 44.7 36.0 - 48.0 %    MCV 91.2 80.0 - 100.0 fL    MCH 30.8

## 2024-05-09 ENCOUNTER — APPOINTMENT (OUTPATIENT)
Age: 34
DRG: 204 | End: 2024-05-09
Payer: COMMERCIAL

## 2024-05-09 ENCOUNTER — APPOINTMENT (OUTPATIENT)
Dept: CT IMAGING | Age: 34
DRG: 204 | End: 2024-05-09
Payer: COMMERCIAL

## 2024-05-09 ENCOUNTER — APPOINTMENT (OUTPATIENT)
Dept: MRI IMAGING | Age: 34
DRG: 204 | End: 2024-05-09
Payer: COMMERCIAL

## 2024-05-09 ENCOUNTER — HOSPITAL ENCOUNTER (INPATIENT)
Age: 34
LOS: 2 days | Discharge: HOME | DRG: 204 | End: 2024-05-11
Attending: EMERGENCY MEDICINE | Admitting: INTERNAL MEDICINE
Payer: COMMERCIAL

## 2024-05-09 DIAGNOSIS — R42 DIZZINESS: Primary | ICD-10-CM

## 2024-05-09 DIAGNOSIS — R06.02 SHORTNESS OF BREATH: ICD-10-CM

## 2024-05-09 PROBLEM — F31.9 BIPOLAR DISORDER (HCC): Status: ACTIVE | Noted: 2024-05-09

## 2024-05-09 PROBLEM — J45.909 UNCOMPLICATED ASTHMA: Status: ACTIVE | Noted: 2024-05-09

## 2024-05-09 PROBLEM — G62.9 POLYNEUROPATHY: Status: ACTIVE | Noted: 2024-05-09

## 2024-05-09 LAB
D DIMER: 0.57 UG/ML FEU (ref 0–0.6)
ECHO AO ROOT DIAM: 3 CM
ECHO AO ROOT INDEX: 1.6 CM/M2
ECHO AV CUSP MM: 1.9 CM
ECHO AV PEAK GRADIENT: 8 MMHG
ECHO AV PEAK VELOCITY: 1.4 M/S
ECHO BSA: 1.94 M2
ECHO LA AREA 2C: 12.8 CM2
ECHO LA AREA 4C: 11.5 CM2
ECHO LA DIAMETER INDEX: 1.81 CM/M2
ECHO LA DIAMETER: 3.4 CM
ECHO LA MAJOR AXIS: 4.9 CM
ECHO LA MINOR AXIS: 5.1 CM
ECHO LA TO AORTIC ROOT RATIO: 1.13
ECHO LA VOL BP: 24 ML (ref 22–52)
ECHO LA VOL MOD A2C: 26 ML (ref 22–52)
ECHO LA VOL MOD A4C: 21 ML (ref 22–52)
ECHO LA VOL/BSA BIPLANE: 13 ML/M2 (ref 16–34)
ECHO LA VOLUME INDEX MOD A2C: 14 ML/M2 (ref 16–34)
ECHO LA VOLUME INDEX MOD A4C: 11 ML/M2 (ref 16–34)
ECHO LV E' LATERAL VELOCITY: 13 CM/S
ECHO LV E' SEPTAL VELOCITY: 11 CM/S
ECHO LV FRACTIONAL SHORTENING: 67 % (ref 28–44)
ECHO LV INTERNAL DIMENSION DIASTOLE INDEX: 2.07 CM/M2
ECHO LV INTERNAL DIMENSION DIASTOLIC: 3.9 CM (ref 3.9–5.3)
ECHO LV INTERNAL DIMENSION SYSTOLIC INDEX: 0.69 CM/M2
ECHO LV INTERNAL DIMENSION SYSTOLIC: 1.3 CM
ECHO LV ISOVOLUMETRIC RELAXATION TIME (IVRT): 69 MS
ECHO LV IVSD: 1.2 CM (ref 0.6–0.9)
ECHO LV MASS 2D: 169.4 G (ref 67–162)
ECHO LV MASS INDEX 2D: 90.1 G/M2 (ref 43–95)
ECHO LV POSTERIOR WALL DIASTOLIC: 1.3 CM (ref 0.6–0.9)
ECHO LV RELATIVE WALL THICKNESS RATIO: 0.67
ECHO MV A VELOCITY: 0.88 M/S
ECHO MV E VELOCITY: 0.68 M/S
ECHO MV E/A RATIO: 0.77
ECHO MV E/E' LATERAL: 5.23
ECHO MV E/E' RATIO (AVERAGED): 5.71
ECHO MV E/E' SEPTAL: 6.18
ECHO PV MAX VELOCITY: 1.3 M/S
ECHO PV PEAK GRADIENT: 6 MMHG
ECHO RA AREA 4C: 9.5 CM2
ECHO RA END SYSTOLIC VOLUME APICAL 4 CHAMBER INDEX BSA: 9 ML/M2
ECHO RA VOLUME: 17 ML
ECHO RV BASAL DIMENSION: 2.2 CM
ECHO RV FREE WALL PEAK S': 16 CM/S
ECHO RV LONGITUDINAL DIMENSION: 7.4 CM
ECHO RV MID DIMENSION: 1.8 CM
ECHO RV TAPSE: 2.8 CM (ref 1.7–?)
TSH SERPL DL<=0.005 MIU/L-ACNC: 2.29 UIU/ML (ref 0.27–4.2)

## 2024-05-09 PROCEDURE — 82746 ASSAY OF FOLIC ACID SERUM: CPT

## 2024-05-09 PROCEDURE — 6370000000 HC RX 637 (ALT 250 FOR IP)

## 2024-05-09 PROCEDURE — 83921 ORGANIC ACID SINGLE QUANT: CPT

## 2024-05-09 PROCEDURE — 70551 MRI BRAIN STEM W/O DYE: CPT

## 2024-05-09 PROCEDURE — 2580000003 HC RX 258

## 2024-05-09 PROCEDURE — 85379 FIBRIN DEGRADATION QUANT: CPT

## 2024-05-09 PROCEDURE — 70450 CT HEAD/BRAIN W/O DYE: CPT

## 2024-05-09 PROCEDURE — 84443 ASSAY THYROID STIM HORMONE: CPT

## 2024-05-09 PROCEDURE — 6360000002 HC RX W HCPCS

## 2024-05-09 PROCEDURE — 6370000000 HC RX 637 (ALT 250 FOR IP): Performed by: EMERGENCY MEDICINE

## 2024-05-09 PROCEDURE — 99223 1ST HOSP IP/OBS HIGH 75: CPT

## 2024-05-09 PROCEDURE — 36415 COLL VENOUS BLD VENIPUNCTURE: CPT

## 2024-05-09 PROCEDURE — 93306 TTE W/DOPPLER COMPLETE: CPT

## 2024-05-09 PROCEDURE — 6360000004 HC RX CONTRAST MEDICATION: Performed by: EMERGENCY MEDICINE

## 2024-05-09 PROCEDURE — 2580000003 HC RX 258: Performed by: EMERGENCY MEDICINE

## 2024-05-09 PROCEDURE — 82607 VITAMIN B-12: CPT

## 2024-05-09 PROCEDURE — 72141 MRI NECK SPINE W/O DYE: CPT

## 2024-05-09 PROCEDURE — 99223 1ST HOSP IP/OBS HIGH 75: CPT | Performed by: PSYCHIATRY & NEUROLOGY

## 2024-05-09 PROCEDURE — 70498 CT ANGIOGRAPHY NECK: CPT

## 2024-05-09 PROCEDURE — 99285 EMERGENCY DEPT VISIT HI MDM: CPT

## 2024-05-09 PROCEDURE — 1200000000 HC SEMI PRIVATE

## 2024-05-09 PROCEDURE — 93306 TTE W/DOPPLER COMPLETE: CPT | Performed by: STUDENT IN AN ORGANIZED HEALTH CARE EDUCATION/TRAINING PROGRAM

## 2024-05-09 RX ORDER — LABETALOL HYDROCHLORIDE 5 MG/ML
10 INJECTION, SOLUTION INTRAVENOUS EVERY 10 MIN PRN
Status: DISCONTINUED | OUTPATIENT
Start: 2024-05-09 | End: 2024-05-11 | Stop reason: HOSPADM

## 2024-05-09 RX ORDER — ENOXAPARIN SODIUM 100 MG/ML
40 INJECTION SUBCUTANEOUS DAILY
Status: DISCONTINUED | OUTPATIENT
Start: 2024-05-09 | End: 2024-05-11 | Stop reason: HOSPADM

## 2024-05-09 RX ORDER — FLUTICASONE PROPIONATE 50 MCG
2 SPRAY, SUSPENSION (ML) NASAL DAILY
Status: DISCONTINUED | OUTPATIENT
Start: 2024-05-09 | End: 2024-05-11 | Stop reason: HOSPADM

## 2024-05-09 RX ORDER — TRAZODONE HYDROCHLORIDE 50 MG/1
75 TABLET ORAL NIGHTLY
COMMUNITY

## 2024-05-09 RX ORDER — 0.9 % SODIUM CHLORIDE 0.9 %
1000 INTRAVENOUS SOLUTION INTRAVENOUS ONCE
Status: COMPLETED | OUTPATIENT
Start: 2024-05-09 | End: 2024-05-09

## 2024-05-09 RX ORDER — ARIPIPRAZOLE 10 MG/1
5 TABLET ORAL DAILY
Status: DISCONTINUED | OUTPATIENT
Start: 2024-05-09 | End: 2024-05-11 | Stop reason: HOSPADM

## 2024-05-09 RX ORDER — ASPIRIN 300 MG/1
300 SUPPOSITORY RECTAL DAILY
Status: DISCONTINUED | OUTPATIENT
Start: 2024-05-09 | End: 2024-05-11 | Stop reason: HOSPADM

## 2024-05-09 RX ORDER — MECLIZINE HYDROCHLORIDE 25 MG/1
25 TABLET ORAL ONCE
Status: COMPLETED | OUTPATIENT
Start: 2024-05-09 | End: 2024-05-09

## 2024-05-09 RX ORDER — SODIUM CHLORIDE 0.9 % (FLUSH) 0.9 %
5-40 SYRINGE (ML) INJECTION PRN
Status: DISCONTINUED | OUTPATIENT
Start: 2024-05-09 | End: 2024-05-11 | Stop reason: HOSPADM

## 2024-05-09 RX ORDER — ALBUTEROL SULFATE 2.5 MG/3ML
2.5 SOLUTION RESPIRATORY (INHALATION) EVERY 4 HOURS PRN
Status: DISCONTINUED | OUTPATIENT
Start: 2024-05-09 | End: 2024-05-11 | Stop reason: HOSPADM

## 2024-05-09 RX ORDER — IPRATROPIUM BROMIDE AND ALBUTEROL SULFATE 2.5; .5 MG/3ML; MG/3ML
1 SOLUTION RESPIRATORY (INHALATION) ONCE
Status: COMPLETED | OUTPATIENT
Start: 2024-05-09 | End: 2024-05-09

## 2024-05-09 RX ORDER — ONDANSETRON 4 MG/1
4 TABLET, ORALLY DISINTEGRATING ORAL EVERY 8 HOURS PRN
Status: DISCONTINUED | OUTPATIENT
Start: 2024-05-09 | End: 2024-05-11 | Stop reason: HOSPADM

## 2024-05-09 RX ORDER — POLYETHYLENE GLYCOL 3350 17 G/17G
17 POWDER, FOR SOLUTION ORAL DAILY PRN
Status: DISCONTINUED | OUTPATIENT
Start: 2024-05-09 | End: 2024-05-11 | Stop reason: HOSPADM

## 2024-05-09 RX ORDER — ARIPIPRAZOLE 5 MG/1
5 TABLET ORAL DAILY
COMMUNITY

## 2024-05-09 RX ORDER — PREDNISONE 20 MG/1
40 TABLET ORAL ONCE
Status: COMPLETED | OUTPATIENT
Start: 2024-05-09 | End: 2024-05-09

## 2024-05-09 RX ORDER — ASPIRIN 81 MG/1
81 TABLET, CHEWABLE ORAL DAILY
Status: DISCONTINUED | OUTPATIENT
Start: 2024-05-09 | End: 2024-05-11 | Stop reason: HOSPADM

## 2024-05-09 RX ORDER — TRAZODONE HYDROCHLORIDE 50 MG/1
75 TABLET ORAL NIGHTLY
Status: DISCONTINUED | OUTPATIENT
Start: 2024-05-09 | End: 2024-05-10

## 2024-05-09 RX ORDER — CLONIDINE HYDROCHLORIDE 0.1 MG/1
.05-.1 TABLET ORAL 2 TIMES DAILY PRN
COMMUNITY

## 2024-05-09 RX ORDER — BUDESONIDE AND FORMOTEROL FUMARATE DIHYDRATE 160; 4.5 UG/1; UG/1
2 AEROSOL RESPIRATORY (INHALATION)
Status: DISCONTINUED | OUTPATIENT
Start: 2024-05-09 | End: 2024-05-11 | Stop reason: HOSPADM

## 2024-05-09 RX ORDER — ONDANSETRON 2 MG/ML
4 INJECTION INTRAMUSCULAR; INTRAVENOUS EVERY 6 HOURS PRN
Status: DISCONTINUED | OUTPATIENT
Start: 2024-05-09 | End: 2024-05-11 | Stop reason: HOSPADM

## 2024-05-09 RX ORDER — SODIUM CHLORIDE 0.9 % (FLUSH) 0.9 %
5-40 SYRINGE (ML) INJECTION EVERY 12 HOURS SCHEDULED
Status: DISCONTINUED | OUTPATIENT
Start: 2024-05-09 | End: 2024-05-11 | Stop reason: HOSPADM

## 2024-05-09 RX ORDER — ATORVASTATIN CALCIUM 40 MG/1
80 TABLET, FILM COATED ORAL NIGHTLY
Status: DISCONTINUED | OUTPATIENT
Start: 2024-05-09 | End: 2024-05-11 | Stop reason: HOSPADM

## 2024-05-09 RX ORDER — SODIUM CHLORIDE 9 MG/ML
INJECTION, SOLUTION INTRAVENOUS PRN
Status: DISCONTINUED | OUTPATIENT
Start: 2024-05-09 | End: 2024-05-11 | Stop reason: HOSPADM

## 2024-05-09 RX ADMIN — ARIPIPRAZOLE 5 MG: 10 TABLET ORAL at 14:37

## 2024-05-09 RX ADMIN — IPRATROPIUM BROMIDE AND ALBUTEROL SULFATE 1 DOSE: .5; 2.5 SOLUTION RESPIRATORY (INHALATION) at 03:25

## 2024-05-09 RX ADMIN — FLUTICASONE PROPIONATE 2 SPRAY: 50 SPRAY, METERED NASAL at 14:38

## 2024-05-09 RX ADMIN — ATORVASTATIN CALCIUM 80 MG: 40 TABLET, FILM COATED ORAL at 22:34

## 2024-05-09 RX ADMIN — ASPIRIN 81 MG: 81 TABLET, CHEWABLE ORAL at 14:37

## 2024-05-09 RX ADMIN — SODIUM CHLORIDE 1000 ML: 9 INJECTION, SOLUTION INTRAVENOUS at 03:22

## 2024-05-09 RX ADMIN — ENOXAPARIN SODIUM 40 MG: 100 INJECTION SUBCUTANEOUS at 14:37

## 2024-05-09 RX ADMIN — MECLIZINE HYDROCHLORIDE 25 MG: 25 TABLET ORAL at 04:55

## 2024-05-09 RX ADMIN — PREDNISONE 40 MG: 20 TABLET ORAL at 03:27

## 2024-05-09 RX ADMIN — TRAZODONE HYDROCHLORIDE 75 MG: 50 TABLET ORAL at 22:34

## 2024-05-09 RX ADMIN — Medication 10 ML: at 22:35

## 2024-05-09 RX ADMIN — IOPAMIDOL 75 ML: 755 INJECTION, SOLUTION INTRAVENOUS at 05:09

## 2024-05-09 ASSESSMENT — PAIN DESCRIPTION - LOCATION: LOCATION: CHEST

## 2024-05-09 ASSESSMENT — PAIN SCALES - GENERAL
PAINLEVEL_OUTOF10: 3
PAINLEVEL_OUTOF10: 0

## 2024-05-09 ASSESSMENT — PAIN - FUNCTIONAL ASSESSMENT
PAIN_FUNCTIONAL_ASSESSMENT: 0-10
PAIN_FUNCTIONAL_ASSESSMENT: NONE - DENIES PAIN

## 2024-05-09 NOTE — PROGRESS NOTES
RT Inhaler-Nebulizer Bronchodilator Protocol Note    There is a bronchodilator order in the chart from a provider indicating to follow the RT Bronchodilator Protocol and there is an “Initiate RT Inhaler-Nebulizer Bronchodilator Protocol” order as well (see protocol at bottom of note).    CXR Findings:  No results found.    The findings from the last RT Protocol Assessment were as follows:   History Pulmonary Disease: Chronic pulmonary disease  Respiratory Pattern: Regular pattern and RR 12-20 bpm  Breath Sounds: Clear breath sounds  Cough: Strong, spontaneous, non-productive  Indication for Bronchodilator Therapy: On home bronchodilators  Bronchodilator Assessment Score: 2    Aerosolized bronchodilator medication orders have been revised according to the RT Inhaler-Nebulizer Bronchodilator Protocol below.    Respiratory Therapist to perform RT Therapy Protocol Assessment initially then follow the protocol.  Repeat RT Therapy Protocol Assessment PRN for score 0-3 or on second treatment, BID, and PRN for scores above 3.    No Indications - adjust the frequency to every 6 hours PRN wheezing or bronchospasm, if no treatments needed after 48 hours then discontinue using Per Protocol order mode.     If indication present, adjust the RT bronchodilator orders based on the Bronchodilator Assessment Score as indicated below.  Use Inhaler orders unless patient has one or more of the following: on home nebulizer, not able to hold breath for 10 seconds, is not alert and oriented, cannot activate and use MDI correctly, or respiratory rate 25 breaths per minute or more, then use the equivalent nebulizer order(s) with same Frequency and PRN reasons based on the score.  If a patient is on this medication at home then do not decrease Frequency below that used at home.    0-3 - enter or revise RT bronchodilator order(s) to equivalent RT Bronchodilator order with Frequency of every 4 hours PRN for wheezing or increased work of  breathing using Per Protocol order mode.        4-6 - enter or revise RT Bronchodilator order(s) to two equivalent RT bronchodilator orders with one order with BID Frequency and one order with Frequency of every 4 hours PRN wheezing or increased work of breathing using Per Protocol order mode.        7-10 - enter or revise RT Bronchodilator order(s) to two equivalent RT bronchodilator orders with one order with TID Frequency and one order with Frequency of every 4 hours PRN wheezing or increased work of breathing using Per Protocol order mode.       11-13 - enter or revise RT Bronchodilator order(s) to one equivalent RT bronchodilator order with QID Frequency and an Albuterol order with Frequency of every 4 hours PRN wheezing or increased work of breathing using Per Protocol order mode.      Greater than 13 - enter or revise RT Bronchodilator order(s) to one equivalent RT bronchodilator order with every 4 hours Frequency and an Albuterol order with Frequency of every 2 hours PRN wheezing or increased work of breathing using Per Protocol order mode.     RT to enter RT Home Evaluation for COPD & MDI Assessment order using Per Protocol order mode.    Electronically signed by Tiarra Garcia RCP on 5/9/2024 at 3:46 PM

## 2024-05-09 NOTE — DISCHARGE INSTRUCTIONS
Your workup in the ED was reassuring. We discussed possible admission but you declined. Please follow up with your primary doctor and return to the ED if you have any worsening or new symptoms.

## 2024-05-09 NOTE — ED PROVIDER NOTES
[facility] Emergency Department      CHIEF COMPLAINT  Shortness of Breath (dizz) and Dizziness (Pt just here in ED  and d/c at 2100.  Feeling no better and came back.  )      HISTORY OF PRESENT ILLNESS  Roxanne Nicholas is a 33 y.o. female with a history of alpha-1 antitrypsin deficiency, BPD, and asthma presents complaining of shortness of breath and dizziness that started on 24 in the morning.  She states her shortness of breath is worse with exertion and ambulating.  She states she also gets dizzier.  She states she started to feel as though she has tunnel vision and is spinning. She states she just feels so tired that she had to sit down.  She states she came here earlier in the day.  She states that the doctor that saw her earlier want to admit her but she decided to go home.  Her symptoms have not improved so she came back.  She denies fever, chest pain, leg swelling, nausea, vomiting, changes in vision, weakness, numbness.  No other complaints, modifying factors or associated symptoms.     History obtained from the patient.  She presents with acute illness.    I have reviewed the following from the nursing documentation.    Past Medical History:   Diagnosis Date    Alpha-1-antitrypsin deficiency (HCC)     Asthma     Bronchopulmonary dysplasia of      Heart murmur     History of gallstones     PFO (patent foramen ovale)      Past Surgical History:   Procedure Laterality Date    ADENOIDECTOMY      CARDIAC SURGERY      PDA coiling    CHOLECYSTECTOMY  2011    HYSTERECTOMY  2017    Robotic Assisted TLH & BS    TONSILLECTOMY       No family history on file.  Social History     Socioeconomic History    Marital status:      Spouse name: Not on file    Number of children: Not on file    Years of education: Not on file    Highest education level: Not on file   Occupational History    Not on file   Tobacco Use    Smoking status: Never    Smokeless tobacco: Never    Tobacco  nystagmus.  PSYCHIATRIC: Normal mood and affect.    LABS  I have reviewed all labs for this visit.   Results for orders placed or performed during the hospital encounter of 05/09/24   TSH with Reflex   Result Value Ref Range    TSH 2.29 0.27 - 4.20 uIU/mL         RADIOLOGY  X-RAYS: ALL IMAGES INCLUDING PLAIN FILMS, CT, ULTRASOUND AND MRI HAVE BEEN READ BY THE RADIOLOGIST.   CTA HEAD NECK W CONTRAST   Final Result   Unremarkable CTA of the head and neck.              Medications administered. (Dose and Route):  1 L normal saline IV, 25 mg meclizine p.o.    ED COURSE/MDM:  Patient seen and evaluated. Here the patient is afebrile with normal vitals signs.  Lungs are clear.  Heart regular rate and rhythm.  Respirations even and unlabored.  Speaking full sentences.  No edema.  No focal deficits    Differential diagnosis includes but not limited to viral illness, PE, pneumonia, vertigo    Old records reviewed: Echo 10/2018  Summary:   The left ventricular wall motion is normal.   Overall left ventricular ejection fraction is estimated to be 50-55%.   Doppler suggests right to left interatrial shunt.   The atrial septal defect is small.   No significant valvular abnormalities identified.   Reviewed lab work and imaging from 5/8/24.  CBC normal.  CMP grossly unremarkable.  Lactate 1.2.  ABG shows a pH of 7.3 with a CO2 of 39.8.  D-dimer 0.5.  BMP normal.  Troponin normal.  EKG was sinus tachycardia.  Chest x-ray no acute process.  COVID and influenza negative.    Diagnoses affirmatively addressed, consideration of tests, treatments & admission, including shared decision making:  TSH normal.  CT head and neck grossly unremarkable except for maxillary sinus cyst.  Patient was given DuoNebs, 1 L normal saline, meclizine without improvement of symptoms.  Patient very symptomatic of her dizziness when ambulating.  This is her second ED visit for her symptoms without any improvement.  Will admit to hospitalist for further

## 2024-05-09 NOTE — PROGRESS NOTES
Physical /OccupationalTherapy    Noted PT/OT orders. Limited notes. Will await further information prior to evaluation.    Keli Luque, PT #[id number]   Brook Partida OTR/L

## 2024-05-09 NOTE — H&P
Hospital Medicine History & Physical      PCP: Timur Shea MD    Date of Admission: 2024    Date of Service: Pt seen/examined on 2024      Chief Complaint:    Chief Complaint   Patient presents with    Shortness of Breath     dizz    Dizziness     Pt just here in ED  and d/c at 2100.  Feeling no better and came back.           History Of Present Illness:      The patient is a 33 y.o. female with pmhx of PDA, substance abuse, bipolar disorder, polyneuropathy, asthma who presented to [facility] ED with complaint of dizziness and falls at home. Reportedly has had polyneuropathy and paresthesias since childhood and always has falls 2/2 to this. Did have prodromal dizziness this past time, no syncope. Checked blood pressure and it was low 60/40s.Did not hit her head. Later that day felt very fatigued and short of breath with minimal activity. Also had some chest heaviness waxing and waning.      Past Medical History:        Diagnosis Date    Alpha-1-antitrypsin deficiency (HCC)     Asthma     Bronchopulmonary dysplasia of      Heart murmur     History of gallstones     PFO (patent foramen ovale)        Past Surgical History:        Procedure Laterality Date    ADENOIDECTOMY      CARDIAC SURGERY      PDA coiling    CHOLECYSTECTOMY  2011    HYSTERECTOMY  2017    Robotic Assisted TLH & BS    TONSILLECTOMY         Medications Prior to Admission:    Prior to Admission medications    Medication Sig Start Date End Date Taking? Authorizing Provider   ARIPiprazole (ABILIFY) 5 MG tablet Take 1 tablet by mouth daily   Yes ProviderAnthony MD   cloNIDine (CATAPRES) 0.1 MG tablet Take 0.5-1 tablets by mouth 2 times daily as needed for Other (sleep/ anxiety)   Yes ProviderAnthony MD   traZODone (DESYREL) 50 MG tablet Take 1.5 tablets by mouth nightly   Yes ProviderAnthony MD   fluticasone (FLONASE) 50 MCG/ACT nasal spray 2 sprays by Each Nostril route daily 10/22/23   Koko

## 2024-05-09 NOTE — PROGRESS NOTES
Consult has been called to Dr. cross on 5/9/24. Spoke with dr cross via perfect serve. 2:40 PM    Emily Pascual  5/9/2024   Symptoms and examination are most consistent with gastroenteritis.  Suggest Gatorade and Zofran 4 mg as prescribed.  Start brat diet when better and advance diet as tolerated.  Suggest holding metformin and Bumex until p.o. intake is adequate.

## 2024-05-09 NOTE — ED NOTES
RN attempted to ambulate pt to restroom.  Pt c/o severe spinning sensation and feeling like she was going to fall.  RN obtained wheelchair and patient voided in restroom.  Back to bed via wheelchair.  Bp, heart rate and oxygen saturation upon returning to bed was wnl.  Christine burroughs

## 2024-05-09 NOTE — CONSULTS
Neurology consultation note    Patient name: Roxanne Nicholas      Chief Complaint:  Lightheadedness and left-sided numbness.    History of present illness:  This is a 33 years old right-handed female.  The patient was brought to the ER after the patient developed lightheadedness and dizziness.  The patient was discharged from the ER last night and came back this morning due to worsening of symptoms.  The patient denies significant dizziness during my assessment.  The patient showed her blood pressure recording about 100/50 and 60/40 when she had lightheadedness at home.  The patient also has chronic left-sided numbness and weakness.  The patient was seen by  back in .  At that time, the patient was found B12 deficiency.  The patient had normal MRI brain and EMG at that time.  The patient has had normal blood pressure during this admission.    Past medical history:    Past Medical History:   Diagnosis Date    Alpha-1-antitrypsin deficiency (HCC)     Asthma     Bronchopulmonary dysplasia of      Heart murmur     History of gallstones     PFO (patent foramen ovale)        Past surgical history:    Past Surgical History:   Procedure Laterality Date    ADENOIDECTOMY      CARDIAC SURGERY      PDA coiling    CHOLECYSTECTOMY  2011    HYSTERECTOMY  2017    Robotic Assisted TLH & BS    TONSILLECTOMY          Medication:    Current Facility-Administered Medications   Medication Dose Route Frequency Provider Last Rate Last Admin    ARIPiprazole (ABILIFY) tablet 5 mg  5 mg Oral Daily Kimmy Gill PA-C   5 mg at 24 1437    albuterol (PROVENTIL) (2.5 MG/3ML) 0.083% nebulizer solution 2.5 mg  2.5 mg Nebulization Q4H PRN Kimmy Gill PA-C        fluticasone (FLONASE) 50 MCG/ACT nasal spray 2 spray  2 spray Each Nostril Daily Kimmy Gill PA-C   2 spray at 24 1438    budesonide-formoterol (SYMBICORT) 160-4.5 MCG/ACT inhaler 2 puff  2 puff Inhalation BID RT Kimmy Gill PA-C

## 2024-05-09 NOTE — ED NOTES
Ambulated patient from R6 to CT doors and back. 02 SATS stayed between 97-98. Patient stopped 3 times complaining of being dizzy.

## 2024-05-09 NOTE — PROGRESS NOTES
Patient admitted to room 203. Oriented to room and call light. Bed wheels locked. Call light within reach and use of call light explained to patient.  No other needs identified at this time. Will continue to monitor.    4 Eyes Skin Assessment     The patient is being assess for   Admission    I agree that 2 RN's have performed a thorough Head to Toe Skin Assessment on the patient. ALL assessment sites listed below have been assessed.      Patient has a bruise to her left thigh and multiple scars on her chest    Areas assessed for pressure by both nurses:   [x]   Head, Face, and Ears   [x]   Shoulders, Back, and Chest, Abdomen  [x]   Arms, Elbows, and Hands   [x]   Coccyx, Sacrum, and Ischium  [x]   Legs, Feet, and Heels        Skin Assessed Under all Medical Devices by both nurses:  N/a               All Mepilex Borders were peeled back and area peeked at by both nurses:  No: n/a  Please list where Mepilex Borders are located:  n/a             **SHARE this note so that the co-signing nurse is able to place an eSignature**    Co-signer eSignature: Electronically signed by Melisa Merida RN on 5/9/24 at 3:19 PM EDT    Does the Patient have Skin Breakdown related to pressure?  No     (Insert Photo here)         Sacha Prevention initiated:  No   Wound Care Orders initiated:  No      [facility] nurse consulted for Pressure Injury (Stage 3,4, Unstageable, DTI, NWPT, Complex wounds)and New or Established Ostomies:  No      Primary Nurse eSignature: Electronically signed by Allyson Harrison RN on 5/9/24 at 3:19 PM EDT          Bedside Mobility Assessment Tool (BMAT):     Assessment Level 1- Sit and Shake    1. From a semi-reclined position, ask patient to sit up and rotate to a seated position at the side of the bed. Can use the bedrail.    2. Ask patient to reach out and grab your hand and shake making sure patient reaches across his/her midline.   Pass- Patient is able to come to a seated position, maintain core strength.  midline. Move on to Assessment Level 2.     Assessment Level 2- Stretch and Point   1. With patient in seated position at the side of the bed, have patient place both feet on the floor (or stool) with knees no higher than hips.    2. Ask patient to stretch one leg and straighten the knee, then bend the ankle/flex and point the toes. If appropriate, repeat with the other leg.   Pass- Patient is able to demonstrate appropriate quad strength on intended weight bearing limb(s). Move onto Assessment Level 3.     Assessment Level 3- Stand   1. Ask patient to elevate off the bed or chair (seated to standing) using an assistive device (cane, bedrail).    2. Patient should be able to raise buttocks off be and hold for a count of five. May repeat once.   Pass- Patient maintains standing stability for at least 5 seconds, proceed to assessment level 4.    Assessment Level 4- Walk   1. Ask patient to march in place at bedside.    2. Then ask patient to advance step and return each foot. Some medical conditions may render a patient from stepping backwards, use your best clinical judgement.   Fail- Patient not able to complete tasks OR requires use of assistive device. Patient is MOBILITY LEVEL 3.       Mobility Level- 3      Patient is able to demonstrate the ability to move from a reclining position to an upright position within the recliner.

## 2024-05-09 NOTE — ED NOTES
Patient's family called the ED stating that patient needed help. RN entered the room and patient states that she called out awhile ago asking for assistance to the bathroom and was ignored. This RN did not receive notice that patient called out. She states that she has been recording staff ignoring her calls. RN informed patient of the policy that she is not allowed to record staff, security called to bedside to also educate patient. RN supervisor at bedside to speak with patient also.

## 2024-05-10 ENCOUNTER — TELEPHONE (OUTPATIENT)
Age: 34
End: 2024-05-10

## 2024-05-10 PROBLEM — I95.1 POSTURAL HYPOTENSION: Status: ACTIVE | Noted: 2024-05-10

## 2024-05-10 PROBLEM — I95.1 ORTHOSTATIC HYPOTENSION: Status: ACTIVE | Noted: 2024-05-10

## 2024-05-10 LAB
ANION GAP SERPL CALCULATED.3IONS-SCNC: 10 MMOL/L (ref 3–16)
BUN SERPL-MCNC: 12 MG/DL (ref 7–20)
CALCIUM SERPL-MCNC: 8.4 MG/DL (ref 8.3–10.6)
CHLORIDE SERPL-SCNC: 109 MMOL/L (ref 99–110)
CHOLEST SERPL-MCNC: 166 MG/DL (ref 0–199)
CO2 SERPL-SCNC: 24 MMOL/L (ref 21–32)
CREAT SERPL-MCNC: 0.6 MG/DL (ref 0.6–1.1)
DEPRECATED RDW RBC AUTO: 13.4 % (ref 12.4–15.4)
FOLATE SERPL-MCNC: 7.55 NG/ML (ref 4.78–24.2)
GFR SERPLBLD CREATININE-BSD FMLA CKD-EPI: >90 ML/MIN/{1.73_M2}
GLUCOSE SERPL-MCNC: 106 MG/DL (ref 70–99)
HCT VFR BLD AUTO: 40.2 % (ref 36–48)
HDLC SERPL-MCNC: 44 MG/DL (ref 40–60)
HGB BLD-MCNC: 13.9 G/DL (ref 12–16)
LDLC SERPL CALC-MCNC: 93 MG/DL
MCH RBC QN AUTO: 31.5 PG (ref 26–34)
MCHC RBC AUTO-ENTMCNC: 34.5 G/DL (ref 31–36)
MCV RBC AUTO: 91.2 FL (ref 80–100)
PLATELET # BLD AUTO: 246 K/UL (ref 135–450)
PMV BLD AUTO: 9 FL (ref 5–10.5)
POTASSIUM SERPL-SCNC: 3.6 MMOL/L (ref 3.5–5.1)
RBC # BLD AUTO: 4.41 M/UL (ref 4–5.2)
SODIUM SERPL-SCNC: 143 MMOL/L (ref 136–145)
TRIGL SERPL-MCNC: 144 MG/DL (ref 0–150)
VIT B12 SERPL-MCNC: 266 PG/ML (ref 211–911)
VLDLC SERPL CALC-MCNC: 29 MG/DL
WBC # BLD AUTO: 7.5 K/UL (ref 4–11)

## 2024-05-10 PROCEDURE — 99232 SBSQ HOSP IP/OBS MODERATE 35: CPT | Performed by: INTERNAL MEDICINE

## 2024-05-10 PROCEDURE — 83036 HEMOGLOBIN GLYCOSYLATED A1C: CPT

## 2024-05-10 PROCEDURE — 2580000003 HC RX 258: Performed by: INTERNAL MEDICINE

## 2024-05-10 PROCEDURE — 97166 OT EVAL MOD COMPLEX 45 MIN: CPT

## 2024-05-10 PROCEDURE — 94761 N-INVAS EAR/PLS OXIMETRY MLT: CPT

## 2024-05-10 PROCEDURE — 2580000003 HC RX 258

## 2024-05-10 PROCEDURE — 80061 LIPID PANEL: CPT

## 2024-05-10 PROCEDURE — 80048 BASIC METABOLIC PNL TOTAL CA: CPT

## 2024-05-10 PROCEDURE — 97530 THERAPEUTIC ACTIVITIES: CPT

## 2024-05-10 PROCEDURE — 92610 EVALUATE SWALLOWING FUNCTION: CPT

## 2024-05-10 PROCEDURE — 99233 SBSQ HOSP IP/OBS HIGH 50: CPT | Performed by: PSYCHIATRY & NEUROLOGY

## 2024-05-10 PROCEDURE — 94640 AIRWAY INHALATION TREATMENT: CPT

## 2024-05-10 PROCEDURE — 92526 ORAL FUNCTION THERAPY: CPT

## 2024-05-10 PROCEDURE — 85027 COMPLETE CBC AUTOMATED: CPT

## 2024-05-10 PROCEDURE — 97162 PT EVAL MOD COMPLEX 30 MIN: CPT

## 2024-05-10 PROCEDURE — 1200000000 HC SEMI PRIVATE

## 2024-05-10 PROCEDURE — 36415 COLL VENOUS BLD VENIPUNCTURE: CPT

## 2024-05-10 PROCEDURE — 6360000002 HC RX W HCPCS

## 2024-05-10 PROCEDURE — 6370000000 HC RX 637 (ALT 250 FOR IP)

## 2024-05-10 PROCEDURE — 97535 SELF CARE MNGMENT TRAINING: CPT

## 2024-05-10 RX ORDER — TRAZODONE HYDROCHLORIDE 50 MG/1
75 TABLET ORAL NIGHTLY PRN
Status: DISCONTINUED | OUTPATIENT
Start: 2024-05-10 | End: 2024-05-11 | Stop reason: HOSPADM

## 2024-05-10 RX ORDER — SODIUM CHLORIDE 9 MG/ML
INJECTION, SOLUTION INTRAVENOUS CONTINUOUS
Status: DISCONTINUED | OUTPATIENT
Start: 2024-05-10 | End: 2024-05-11 | Stop reason: HOSPADM

## 2024-05-10 RX ADMIN — FLUTICASONE PROPIONATE 2 SPRAY: 50 SPRAY, METERED NASAL at 08:41

## 2024-05-10 RX ADMIN — Medication 2 PUFF: at 07:52

## 2024-05-10 RX ADMIN — Medication 10 ML: at 22:34

## 2024-05-10 RX ADMIN — TIOTROPIUM BROMIDE INHALATION SPRAY 2 PUFF: 3.12 SPRAY, METERED RESPIRATORY (INHALATION) at 07:52

## 2024-05-10 RX ADMIN — ENOXAPARIN SODIUM 40 MG: 100 INJECTION SUBCUTANEOUS at 08:42

## 2024-05-10 RX ADMIN — Medication 2 PUFF: at 20:01

## 2024-05-10 RX ADMIN — ARIPIPRAZOLE 5 MG: 10 TABLET ORAL at 08:42

## 2024-05-10 RX ADMIN — SODIUM CHLORIDE: 9 INJECTION, SOLUTION INTRAVENOUS at 14:01

## 2024-05-10 RX ADMIN — ATORVASTATIN CALCIUM 80 MG: 40 TABLET, FILM COATED ORAL at 22:34

## 2024-05-10 RX ADMIN — ASPIRIN 81 MG: 81 TABLET, CHEWABLE ORAL at 08:42

## 2024-05-10 NOTE — PLAN OF CARE
Problem: SLP Adult - Impaired Swallowing  Goal: By Discharge: Advance to least restrictive diet without signs or symptoms of aspiration for planned discharge setting.  See evaluation for individualized goals.  Note: SLP completed evaluation. Please refer to notes in EMR.    Mikki Gale M.A., CCC-SLP   Speech-Language Pathologist #[id number]  Speech Pathology and Swallowing Disorders  P: (inpatient): [id number] (speech desk): [phone]  E: [email or web address]  Certified to provide:  FEES, MBS, Vital Stim, and Koko Dysphagia Therapy Program (MDTP)

## 2024-05-10 NOTE — PROGRESS NOTES
Neurology Progress Note    ID: Roxanne Nicholas is a 33 y.o. female    : 1990     LOS: 1 day     ASSESSMENT    Principal Problem:    Dizziness  Active Problems:    Shortness of breath    Bipolar disorder (HCC)    Uncomplicated asthma    Polyneuropathy  Resolved Problems:    * No resolved hospital problems. *    The patient has inconsistent weakness on the left side.  The left hemihypoesthesia is not typical for CVA or intracranial lesion.  From neurology perspective, this is likely functional neurological disorder.  MRI brain showed no evidence of acute or chronic pathology to explain her symptoms.  MRI of the cervical spine also cannot reveal the etiology of her symptoms.    The patient remains to have orthostatic hypotension this morning.  If there is no evidence of cardiac disorder to explain the problem, the patient may need autonomic study on tilt table test.      Plan:     1.  Follow-up B12, folate and methylmalonic acid level.  2.  PT/OT/ST.  3.  Maintain hydration.    There is nothing else neurology can offer at this time.  Neurology will sign off.    Medications:  Scheduled Meds:    ARIPiprazole  5 mg Oral Daily    fluticasone  2 spray Each Nostril Daily    budesonide-formoterol  2 puff Inhalation BID RT    tiotropium  2 puff Inhalation Daily RT    sodium chloride flush  5-40 mL IntraVENous 2 times per day    enoxaparin  40 mg SubCUTAneous Daily    atorvastatin  80 mg Oral Nightly    aspirin  81 mg Oral Daily    Or    aspirin  300 mg Rectal Daily    traZODone  75 mg Oral Nightly     Continuous Infusions:    sodium chloride       PRN Meds: albuterol, sodium chloride flush, sodium chloride, ondansetron **OR** ondansetron, polyethylene glycol, labetalol    OBJECTIVE  Vital signs in the last 24 hours:  Vitals:    05/10/24 0759   BP:    Pulse:    Resp:    Temp:    SpO2: 98%       Intake/Output last 3 shifts:  I/O last 3 completed shifts:  In: 570 [P.O.:570]  Out: -     Intake/Output this shift:  No  intake/output data recorded.    Yesterday's Weight:  Wt Readings from Last 1 Encounters:   05/10/24 83.2 kg (183 lb 8 oz)       SUBJECTIVE  There was no acute event overnight.    ROS:  Negative except in subjective.    Neurological examination:  MENTAL STATUS:  Alert and oriented to person.  LANG/SPEECH: No dysarthria.  CRANIAL NERVES: No facial asymmetry.  MOTOR: Inconsistent weakness on the left.  REFLEXES: Generalized 2+.  SENSORY: Loss of light touch sensation from midline to the left including face, arm, leg and trunk.  COORD: No tremor.    Labs and Imaging:  I reviewed labs and brain imaging.    50 minutes were spent with the patient with greater than 50% of the time spent in counseling and coordination of care.    Karmen Ferguson MD

## 2024-05-10 NOTE — PROGRESS NOTES
Pt A&Ox4, vss, NIHSS scale 1-see flowsheet. Shift assessment completed, PM medications given, bed locked, bed alarm on, call light in reach.

## 2024-05-10 NOTE — TELEPHONE ENCOUNTER
Per Dr. Panda - he evaluated pt in hospital and states she does not need out patient neurology follow with him.

## 2024-05-10 NOTE — PROGRESS NOTES
IM Progress Note    Admit Date:  5/9/2024  1    Interval history:   dizziness and unsteady gait    Orthostatic this am with PT     Subjective:  Ms. Nicholas seen up in bed  feels ok while in bed but dizzy with walking  No fevers  Not taking any BP meds at home      Objective:   /74   Pulse 86   Temp 98.1 °F (36.7 °C) (Oral)   Resp 16   Ht 1.626 m (5' 4\")   Wt 83.2 kg (183 lb 8 oz)   LMP 07/12/2014   SpO2 98%   BMI 31.50 kg/m²     Intake/Output Summary (Last 24 hours) at 5/10/2024 1159  Last data filed at 5/10/2024 1121  Gross per 24 hour   Intake 930 ml   Output 400 ml   Net 530 ml       Physical Exam:          General: middle aged female up in bed    Awake, alert and oriented. Appears to be not in any distress  Mucous Membranes:  Pink , anicteric  Neck: No JVD, no carotid bruit, no thyromegaly  Chest:  Clear to auscultation bilaterally, no added sounds  Cardiovascular:  RRR S1S2 heard, no murmurs or gallops  Abdomen:  Soft, undistended, non tender, no organomegaly, BS present  Extremities: No edema or cyanosis. Distal pulses well felt  Neurological : grossly normal  Non focal     Medications:   Scheduled Medications:    ARIPiprazole  5 mg Oral Daily    fluticasone  2 spray Each Nostril Daily    budesonide-formoterol  2 puff Inhalation BID RT    tiotropium  2 puff Inhalation Daily RT    sodium chloride flush  5-40 mL IntraVENous 2 times per day    enoxaparin  40 mg SubCUTAneous Daily    atorvastatin  80 mg Oral Nightly    aspirin  81 mg Oral Daily    Or    aspirin  300 mg Rectal Daily     I   sodium chloride      sodium chloride       traZODone, albuterol, sodium chloride flush, sodium chloride, ondansetron **OR** ondansetron, polyethylene glycol, labetalol    Lab Data:  Recent Labs     05/08/24  1600 05/10/24  0540   WBC 9.7 7.5   HGB 15.1 13.9   HCT 44.7 40.2   MCV 91.2 91.2    246     Recent Labs     05/08/24  1600 05/10/24  0540    143   K 4.1 3.6    109   CO2 26 24   BUN 13 12    CREATININE 0.6 0.6     No results for input(s): \"CKTOTAL\", \"CKMB\", \"CKMBINDEX\", \"TROPONINI\" in the last 72 hours.    Coagulation:   Lab Results   Component Value Date/Time    INR 0.97 05/22/2023 11:15 AM     Cardiac markers:   Lab Results   Component Value Date/Time    TROPONINI <0.01 04/23/2020 11:34 PM         Lab Results   Component Value Date    ALT 40 05/08/2024    AST 30 05/08/2024    ALKPHOS 71 05/08/2024    BILITOT <0.2 05/08/2024       Lab Results   Component Value Date    INR 0.97 05/22/2023    PROTIME 12.9 05/22/2023       RADIOLOGY  CT HEAD WO CONTRAST   Final Result   No acute intracranial abnormality.       Mucous retention cyst right maxillary sinus           CTA HEAD NECK W CONTRAST   Final Result   Unremarkable CTA of the head and neck.            MRI BRAIN WO CONTRAST   Final Result   No acute intracranial abnormality.       Mild right mastoid effusion.           MRI CERVICAL SPINE WO CONTRAST   Final Result   No fracture or subluxation in the cervical spine.       Spinal canal narrowing, mild at C2-3.       No foraminal narrowing.           CT HEAD WO CONTRAST   Final Result   No acute intracranial abnormality.       Mucous retention cyst right maxillary sinus           CTA HEAD NECK W CONTRAST   Final Result   Unremarkable CTA of the head and neck.              ECHO 5/24      Left Ventricle: Normal left ventricular systolic function with a visually estimated EF of 60 - 65%. Left ventricle size is normal. Mildly increased wall thickness. Normal wall motion. Grade I diastolic dysfunction with normal LAP.    Right Ventricle: Right ventricle size is normal. Normal systolic function. TAPSE is 2.8 cm. RV Peak S' is 16 cm/s.    Aortic Valve: Valve structure is normal. No regurgitation. No stenosis.    Mitral Valve: No leaflet thickening. Mild annular calcification of the mitral valve. No regurgitation. No stenosis noted.    Tricuspid Valve: Trace regurgitation.    IVC/SVC: IVC diameter is less than or

## 2024-05-10 NOTE — PROGRESS NOTES
Inpatient Physical Therapy Evaluation & Treatment    Unit: [facility]  Date:  5/10/2024  Patient Name:    Roxanne Nicholas  Admitting diagnosis:  Shortness of breath [R06.02]  Dizziness [R42]  Admit Date:  2024  Precautions/Restrictions/WB Status/ Lines/ Wounds/ Oxygen: Fall risk, Bed/chair alarm, and Lines (IV)      Pt seen for cotreatment this date due to patient safety, patient endurance, complexity of condition, acute illness/injury, and need for the assistance of 2 skilled therapists    Treatment Time:  8:45-9:44  Treatment Number:  1   Timed Code Treatment Minutes: 49 minutes  Total Treatment Minutes:  59  minutes    Patient Stated Goals for Therapy: \" to go home \"          Discharge Recommendations:  Await medical  progress;likely home if BP stable  DME needs for discharge: Continue to Assess       Therapy recommendation for EMS Transport: can transport by wheelchair    Therapy recommendations for staff:   Assist of 1 for transfers with use of No AD and gait belt to/from BSC  to/from chair  to/from bathroom at nursing discretion    History of Present Illness: H&P:  \"The patient is a 33 y.o. female with pmhx of PDA, substance abuse, bipolar disorder, polyneuropathy, asthma who presented to [facility] ED with complaint of dizziness and falls at home. Reportedly has had polyneuropathy and paresthesias since childhood and always has falls 2/2 to this. Did have prodromal dizziness this past time, no syncope. Checked blood pressure and it was low 60/40s.Did not hit her head. Later that day felt very fatigued and short of breath with minimal activity. Also had some chest heaviness waxing and waning.\"        Past Medical History:    Past Medical History[]Expand by Default            Diagnosis Date    Alpha-1-antitrypsin deficiency (HCC)      Asthma      Bronchopulmonary dysplasia of       Heart murmur      History of gallstones      PFO (patent foramen ovale)         Imaging:  MRI C-spine:    Impression:   therapeutic exercises, bed mobility, transfers, progressive gait training, balance training, and family/patient education.    Signature: Keli Luque, PT #[id number]     If patient discharges from this facility prior to next visit, this note will serve as the Discharge Summary.    CHF Education  N/A

## 2024-05-10 NOTE — CARE COORDINATION
Case Management Assessment  Initial Evaluation    Date/Time of Evaluation: 5/10/2024 10:53 AM  Assessment Completed by: Katia Bob RN    If patient is discharged prior to next notation, then this note serves as note for discharge by case management.    Patient Name: Roxanne Nicholas                   YOB: 1990  Diagnosis: Shortness of breath [R06.02]  Dizziness [R42]                   Date / Time: 5/9/2024  2:53 AM    Patient Admission Status: Inpatient   Readmission Risk (Low < 19, Mod (19-27), High > 27): Readmission Risk Score: 7.2    Current PCP: Timur Shea MD  PCP verified by CM? Yes (Timur Shea MD)    Chart Reviewed: Yes      History Provided by: Patient, Medical Record  Patient Orientation: Alert and Oriented    Patient Cognition: Alert    Hospitalization in the last 30 days (Readmission):  No    If yes, Readmission Assessment in CM Navigator will be completed.    Advance Directives:      Code Status: Full Code   Patient's Primary Decision Maker is: Patient Declined (Legal Next of Kin Remains as Decision Maker)      Discharge Planning:    Patient lives with: Children Type of Home: [place] ([facility])  Primary Care Giver: Self  Patient Support Systems include: Children, Family Members   Current Financial resources: Medicaid  Current community resources: None  Current services prior to admission: None            Current DME:              Type of Home Care services:  None    ADLS  Prior functional level: Independent in ADLs/IADLs  Current functional level: Independent in ADLs/IADLs    PT AM-PAC: 18 /24  OT AM-PAC: 18 /24    Family can provide assistance at DC: Yes  Would you like Case Management to discuss the discharge plan with any other family members/significant others, and if so, who? No  Plans to Return to Present Housing: Yes  Other Identified Issues/Barriers to RETURNING to current housing: None  Potential Assistance needed at discharge: N/A            Potential DME:     Patient expects to discharge to: House  Plan for transportation at discharge: Family    Financial    Payor: [employer] / Plan: [employer] [place] MEDICAID / Product Type: *No Product type* /     Does insurance require precert for SNF: Yes    Potential assistance Purchasing Medications: No  Meds-to-Beds request:        [id number] - [place], [place] - [address] - P [phone] - F [phone]  [address]  [facility] [address]  Phone: [phone] Fax: [phone]    [facility] - [place], [place] - [address] - P [phone] - F [phone]  [address]  [address]  [facility] [address]  Phone: [phone] Fax: [phone]      Notes:    Factors facilitating achievement of predicted outcomes: Family support, Cooperative, and Pleasant    Barriers to discharge: Impulsivity, Anxiety, and Decreased endurance    Additional Case Management Notes: IPTA; Lives in [facility] with children No needs at this time    The Plan for Transition of Care is related to the following treatment goals of Shortness of breath [R06.02]  Dizziness [R42]    IF APPLICABLE: The Patient and/or patient representative Roxanne and her family were provided with a choice of provider and agrees with the discharge plan. Freedom of choice list with basic dialogue that supports the patient's individualized plan of care/goals and shares the quality data associated with the providers was provided to:     Patient Representative Name:       The Patient and/or Patient Representative Agree with the Discharge Plan?      Katia Bob RN  Case Management Department  Ph: [phone]

## 2024-05-10 NOTE — PLAN OF CARE
Problem: Safety - Adult  Goal: Free from fall injury  5/9/2024 1512 by Allyson Harrison, RN  Outcome: Progressing     Problem: Neurosensory - Adult  Goal: Achieves stable or improved neurological status  Outcome: Progressing     Problem: Musculoskeletal - Adult  Goal: Return mobility to safest level of function  Outcome: Progressing

## 2024-05-10 NOTE — PROGRESS NOTES
Inpatient Occupational Therapy Evaluation and Treatment    Unit: [facility]  Date:  5/10/2024  Patient Name:    Roxanne Nicholas  Admitting diagnosis:  Shortness of breath [R06.02]  Dizziness [R42]  Admit Date:  5/9/2024  Precautions/Restrictions/WB Status/ Lines/ Wounds/ Oxygen: Fall risk, Bed/chair alarm, and Lines (IV)    Pt seen for cotreatment this date due to patient safety, patient endurance, and limited functional status information    Treatment Time:  08:45 - 09:44  Treatment Number:  1  Timed Code Treatment Minutes: 49 minutes  Total Treatment Minutes: 59 minutes    Patient Goals for Therapy: \"to go home\"          Discharge Recommendations: Continue to assess due to medical status - likely home  DME needs for discharge: Needs Met       Therapy recommendations for staff:   Assist of 1 for ambulation with use of gait belt to/from bathroom  within room    History of Present Illness: Per H&P  \"Patient is a 33 y.o. female with pmhx of PDA, substance abuse, bipolar disorder, polyneuropathy, asthma who presented to [facility] ED with complaint of dizziness and falls at home. Reportedly has had polyneuropathy and paresthesias since childhood and always has falls 2/2 to this. Did have prodromal dizziness this past time, no syncope. Checked blood pressure and it was low 60/40s.Did not hit her head. Later that day felt very fatigued and short of breath with minimal activity. Also had some chest heaviness waxing and waning.\"     Home Health S4 Level Recommendation:  NA    AM-PAC Score:       Subjective:  Patient lying reclined in bed with no family present.   Pt agreeable to this OT session.     Cognition:    A&O x4   Able to follow 2 step commands    Pain:   Yes  Location: left thigh  Rating: mild /10  Pain Medicine Status: No request made    Preadmission Environment:   Pt. Lives     with family (2 kids - 15 and 11)  Home environment:    [facility]  Steps to enter first floor:   1 steps to enter  Steps to second  rehabilitation facility upon discharge in order to return to PLOF and prior living environment with the least amount of assistance/supervision required. Patient would benefit from continued skilled acute care occupational therapy services during her length of stay to increase independence with ADL performance and functional mobility in order to maximize their functional potential in the acute care setting.     Continue to assess due to patient's medical status - most likely home     Goal(s) :   To be met in 3 Visits:  Bed to toilet/BSC:       Supervision  Pt will complete 3/3 CHF goals     N/A    To be met in 5 Visits:  Supine to/from Sit in preparation for ADL task:   Independent  Toileting        Independent  Grooming       Independent  Upper Body Dressing:      Independent  Lower Body Dressing:      Independent  Pt to demonstrate UE therapeutic exs x 15 reps with minimal cues    Rehabilitation Potential: Good  Strengths for achieving goals include: PLOF and Pt cooperative   Barriers to achieving goals include:  Complexity of condition    Plan:  To be seen 2-3 x/wk  while in acute care setting for therapeutic exercises, bed mobility, transfers, family/patient education, ADL/IADL retraining, and energy conservation training.    Electronically signed by Phani Zavala OT on 5/10/2024 at 7:48 AM      If patient discharges from this facility prior to next visit, this note will serve as the Discharge Summary

## 2024-05-10 NOTE — PROGRESS NOTES
Speech Language Pathology  Swallowing Disorders and Dysphagia  Clinical Bedside Swallow Assessment  Facility/Department: [address] MEDICAL-SURGICAL    Instrumentation: Not clinically indicated at this time. Will continue to monitor   Diet recommendation: IDDSI 7 Regular Solids; IDDSI 0 Thin Liquids; Meds whole with thin liquids  Risk management: upright for all intake, stay upright for at least 30 mins after intake, small bites/sips, oral care 2-3x/day to reduce adverse affects in the event of aspiration, alternate bites/sips, slow rate of intake, general GERD precautions, general aspiration precautions, and hold PO and contact SLP if s/s of aspiration or worsening respiratory status develop.  Pt c/o GERD sx's, odynophagia, and globus sensation. Reports her dad has had several esophageal dilations. May benefit from GI consult  Although current clinical presentation appears grossly WFL, POC ensures pt is followed 1-3x/wk given nature of potential neurological deficits and risk of acute change in status.      NAME:Roxanne Nicholas  : 1990 (33 y.o.)   MRN: [id number]  ROOM: [address] [place] [address]  ADMISSION DATE: 2024  PATIENT DIAGNOSIS(ES): Shortness of breath [R06.02]  Dizziness [R42]  Chief Complaint   Patient presents with    Shortness of Breath     dizz    Dizziness     Pt just here in ED  and d/c at 2100.  Feeling no better and came back.       Patient Active Problem List    Diagnosis Date Noted    Dizziness 2024    Shortness of breath 2024    Bipolar disorder (HCC) 2024    Uncomplicated asthma 2024    Polyneuropathy 2024    H/O repair of patent ductus arteriosus 2016     Past Medical History:   Diagnosis Date    Alpha-1-antitrypsin deficiency (HCC)     Asthma     Bronchopulmonary dysplasia of      Heart murmur     History of gallstones     PFO (patent foramen ovale)      Past Surgical History:   Procedure Laterality Date    ADENOIDECTOMY      CARDIAC SURGERY       and lack of clinical s/s of aspiration at bedside, pt is safe for oral diet at this time from SLP standpoint. Correlate with MD. Although current clinical presentation appears grossly WFL, POC ensures pt is followed 1-3x/wk given nature of potential neurological deficits and risk of acute change in status.        Instrumentation: Not clinically indicated at this time. Will continue to monitor   Diet recommendation: IDDSI 7 Regular Solids; IDDSI 0 Thin Liquids; Meds whole with thin liquids  Risk management: upright for all intake, stay upright for at least 30 mins after intake, small bites/sips, oral care 2-3x/day to reduce adverse affects in the event of aspiration, alternate bites/sips, slow rate of intake, general GERD precautions, general aspiration precautions, and hold PO and contact SLP if s/s of aspiration or worsening respiratory status develop.  Pt c/o GERD sx's, odynophagia, and globus sensation. Reports her dad has had several esophageal dilations. May benefit from GI consult  Although current clinical presentation appears grossly WFL, POC ensures pt is followed 1-3x/wk given nature of potential neurological deficits and risk of acute change in status.      Prognosis: Good    Recommended Intervention:   Dysphagia treatment                 Dysphagia Therapeutic Intervention:   Oral care  Diet tolerance monitoring  Patient/family education    Referrals:   Neurology  GI    Goals:  Short Term Goals:  Timeframe for Short Term Goals: (5 days 05/15/2024)  Goal 1: The patient will tolerate recommended diet with no clinical s/s of aspiration 5/5  Goal 2: The patient will recall/perform recommended compensatory strategies given min cues      Long Term Goals:   Timeframe for Long Term Goals: (7 days 05/17/2024)  Goal 1: The patient will tolerate least restrictive diet with no clinical s/s of aspiration or worsening respiratory/pulmonary status    Treatment:  Skilled instruction completed with patient re: evidenced

## 2024-05-11 VITALS
DIASTOLIC BLOOD PRESSURE: 74 MMHG | WEIGHT: 183.5 LBS | SYSTOLIC BLOOD PRESSURE: 126 MMHG | TEMPERATURE: 98.3 F | BODY MASS INDEX: 31.33 KG/M2 | HEART RATE: 113 BPM | HEIGHT: 64 IN | RESPIRATION RATE: 18 BRPM | OXYGEN SATURATION: 95 %

## 2024-05-11 LAB
ANION GAP SERPL CALCULATED.3IONS-SCNC: 10 MMOL/L (ref 3–16)
BUN SERPL-MCNC: 11 MG/DL (ref 7–20)
CALCIUM SERPL-MCNC: 9.1 MG/DL (ref 8.3–10.6)
CHLORIDE SERPL-SCNC: 105 MMOL/L (ref 99–110)
CO2 SERPL-SCNC: 23 MMOL/L (ref 21–32)
CORTIS AM PEAK SERPL-MCNC: 4.3 UG/DL (ref 4.3–22.4)
CREAT SERPL-MCNC: 0.6 MG/DL (ref 0.6–1.1)
DEPRECATED RDW RBC AUTO: 13.3 % (ref 12.4–15.4)
EST. AVERAGE GLUCOSE BLD GHB EST-MCNC: 93.9 MG/DL
GFR SERPLBLD CREATININE-BSD FMLA CKD-EPI: >90 ML/MIN/{1.73_M2}
GLUCOSE SERPL-MCNC: 105 MG/DL (ref 70–99)
HBA1C MFR BLD: 4.9 %
HCT VFR BLD AUTO: 40.7 % (ref 36–48)
HGB BLD-MCNC: 13.9 G/DL (ref 12–16)
MCH RBC QN AUTO: 31.5 PG (ref 26–34)
MCHC RBC AUTO-ENTMCNC: 34.1 G/DL (ref 31–36)
MCV RBC AUTO: 92.2 FL (ref 80–100)
PLATELET # BLD AUTO: 252 K/UL (ref 135–450)
PMV BLD AUTO: 8.8 FL (ref 5–10.5)
POTASSIUM SERPL-SCNC: 4.1 MMOL/L (ref 3.5–5.1)
RBC # BLD AUTO: 4.42 M/UL (ref 4–5.2)
SODIUM SERPL-SCNC: 138 MMOL/L (ref 136–145)
WBC # BLD AUTO: 7.9 K/UL (ref 4–11)

## 2024-05-11 PROCEDURE — 99238 HOSP IP/OBS DSCHRG MGMT 30/<: CPT | Performed by: INTERNAL MEDICINE

## 2024-05-11 PROCEDURE — 94761 N-INVAS EAR/PLS OXIMETRY MLT: CPT

## 2024-05-11 PROCEDURE — 80048 BASIC METABOLIC PNL TOTAL CA: CPT

## 2024-05-11 PROCEDURE — 82533 TOTAL CORTISOL: CPT

## 2024-05-11 PROCEDURE — 6370000000 HC RX 637 (ALT 250 FOR IP)

## 2024-05-11 PROCEDURE — 36415 COLL VENOUS BLD VENIPUNCTURE: CPT

## 2024-05-11 PROCEDURE — 94640 AIRWAY INHALATION TREATMENT: CPT

## 2024-05-11 PROCEDURE — 85027 COMPLETE CBC AUTOMATED: CPT

## 2024-05-11 RX ADMIN — TIOTROPIUM BROMIDE INHALATION SPRAY 2 PUFF: 3.12 SPRAY, METERED RESPIRATORY (INHALATION) at 07:11

## 2024-05-11 RX ADMIN — ASPIRIN 81 MG: 81 TABLET, CHEWABLE ORAL at 09:48

## 2024-05-11 RX ADMIN — Medication 2 PUFF: at 07:12

## 2024-05-11 NOTE — DISCHARGE INSTR - ACTIVITY
Your information:  Name: Roxanne Nicholas  : 1990    Your instructions:    Follow up with your PCP  Take medications as prescribed    What to do after you leave the hospital:    Recommended diet: regular diet    Recommended activity: activity as tolerated        The following personal items were collected during your admission and were returned to you:    Belongings  Dental Appliances: None  Vision - Corrective Lenses: Eyeglasses  Hearing Aid: None  Clothing: Shirt, Pajamas  Jewelry: Ring  Electronic Devices: Cell Phone,   Weapons (Notify Protective Services/Security): None  Home Medications: None  Valuables Given To: Patient  Provide Name(s) of Who Valuable(s) Were Given To: Roxanne    Information obtained by:  By signing below, I understand that if any problems occur once I leave the hospital I am to contact MD.  I understand and acknowledge receipt of the instructions indicated above.

## 2024-05-11 NOTE — PROGRESS NOTES
IV removed and discharge instructions completed. All questions were answered to patients satisfaction.   Request for transport placed, all personal belongs packed. No further needs noted.   Patient walked out, her ride waiting at ED entrance

## 2024-05-11 NOTE — DISCHARGE SUMMARY
Name:  Roxanne Nicholas  Room:  0203/0203-01  MRN:    [id number]    IM Discharge Summary    Discharging Physician:  Maurice curtis MD    Admit: 5/9/2024    Discharge:      PCP      Timur Shea MD    Diagnoses and hospital course  this Admission        #Dizziness    - reported low BP at home, Possible orthostatic hypotension   -CT scans as above negative for acute pathology   -trops negative x 2   -EKG without ischemic changes   -TSH WNLs, UDS negative    -d dimer wnl    MRI brain neg  - pt with stable BP and vitals today      #Polyneuropathy   #Paresthesias- left sided   #Multiple falls   #Positive fam hx of MS  -chronic per patient since childhood   -has followed with neurology previously at  , workup at  in 2016 reviewed and was neg   -MRI brain with and without contrast along with MRI c spine neg for any degenerative disorders  - symptoms could be functional   -neurology consulted   - pt reassured and planned for home today      #PDA s/p coiling 1996   -has followed with cardiology previously   -echo as above      #Alpha 1 antitrypsin deficiency  #Asthma without AE  - no acute issues   -continue prn inhalers      #Chronic angina  -follows with cardiology   -on imdur and ranexa in the past   - ECHO with no acute findings      #Hx of polysubstance abuse   -uses methamphetamines and alcohol, has not used in 8 days   -thiamine and MV   -is in IOP currently      #Bipolar disorder  -continue abilify        HPI 33 y.o. female with pmhx of PDA, substance abuse, bipolar disorder, polyneuropathy, asthma who presented to [facility] ED with complaint of dizziness and falls at home. Reportedly has had polyneuropathy and paresthesias since childhood and always has falls 2/2 to this. Did have prodromal dizziness this past time, no syncope. Checked blood pressure and it was low 60/40s.Did not hit her head. Later that day felt very fatigued and short of breath with minimal activity. Also had some chest heaviness 
  -uses methamphetamines and alcohol, has not used in 8 days   -thiamine and MV   -is in IOP currently      #Bipolar disorder  -continue abilify       MRI BRAIN WO CONTRAST   Final Result   No acute intracranial abnormality.      Mild right mastoid effusion.         MRI CERVICAL SPINE WO CONTRAST   Final Result   No fracture or subluxation in the cervical spine.      Spinal canal narrowing, mild at C2-3.      No foraminal narrowing.         CT HEAD WO CONTRAST   Final Result   No acute intracranial abnormality.      Mucous retention cyst right maxillary sinus         CTA HEAD NECK W CONTRAST   Final Result   Unremarkable CTA of the head and neck.                Discharge Medications     Medication List        CHANGE how you take these medications      albuterol (2.5 MG/3ML) 0.083% nebulizer solution  Commonly known as: PROVENTIL  Take 3 mLs by nebulization every 4 hours as needed for Wheezing  What changed: Another medication with the same name was removed. Continue taking this medication, and follow the directions you see here.            CONTINUE taking these medications      ARIPiprazole 5 MG tablet  Commonly known as: ABILIFY     cloNIDine 0.1 MG tablet  Commonly known as: CATAPRES     fluticasone 50 MCG/ACT nasal spray  Commonly known as: FLONASE  2 sprays by Each Nostril route daily     mometasone-formoterol 100-5 MCG/ACT inhaler  Commonly known as: Dulera  Inhale 2 puffs into the lungs 2 times daily     Spiriva HandiHaler 18 MCG inhalation capsule  Generic drug: tiotropium  Inhale 1 capsule into the lungs daily     traZODone 50 MG tablet  Commonly known as: DESYREL            STOP taking these medications      acetaminophen 500 MG tablet  Commonly known as: TYLENOL     benzonatate 100 MG capsule  Commonly known as: Tessalon Perles     brompheniramine-pseudoephedrine-DM 2-30-10 MG/5ML syrup  Commonly known as: Bromfed DM     E-Z Spacer Dafne     isosorbide mononitrate 30 MG extended release tablet  Commonly

## 2024-05-11 NOTE — PLAN OF CARE
Problem: Safety - Adult  Goal: Free from fall injury  5/11/2024 0958 by Tiarra Ponce RN  Outcome: Adequate for Discharge  5/11/2024 0813 by Tiarra Ponce RN  Outcome: Progressing  5/10/2024 2235 by Tiarra Brown RN  Outcome: Progressing     Problem: Discharge Planning  Goal: Discharge to home or other facility with appropriate resources  5/11/2024 0958 by Tiarra Ponce RN  Outcome: Adequate for Discharge  5/11/2024 0813 by Tiarra Ponce RN  Outcome: Progressing  5/10/2024 2235 by Tiarra Brown RN  Outcome: Progressing     Problem: Neurosensory - Adult  Goal: Achieves stable or improved neurological status  5/11/2024 0958 by Tiarra Ponce RN  Outcome: Adequate for Discharge  5/11/2024 0813 by Tiarra Ponce RN  Outcome: Progressing  5/10/2024 2235 by Tiarra Brown RN  Outcome: Progressing     Problem: Musculoskeletal - Adult  Goal: Return mobility to safest level of function  5/11/2024 0958 by Tiarra Ponce RN  Outcome: Adequate for Discharge  5/11/2024 0813 by Tiarra Ponce RN  Outcome: Progressing  5/10/2024 2235 by Tiarra Brown RN  Outcome: Progressing

## 2024-05-11 NOTE — PROGRESS NOTES
IV catheter discontinued intact. Site without signs and symptoms of complications. Dressing and pressure applied.

## 2024-05-11 NOTE — PROGRESS NOTES
Shift assessment complete. See flow sheet. Due medications administered per MD orders. See MAR. Vital signs stable. Patient is alert and oriented x 4. Pt denies any present needs/concerns. Call light explained and in reach.

## 2024-05-11 NOTE — FLOWSHEET NOTE
05/11/24 0945   Vital Signs   Temp 98.3 °F (36.8 °C)   Temp Source Oral   Pulse (!) 113   Heart Rate Source Monitor   Respirations 18   /74   MAP (Calculated) 91   BP Location Right upper arm   BP Method Automatic   Patient Position Sitting   Pain Assessment   Pain Assessment None - Denies Pain   Opioid-Induced Sedation   POSS Score 1   RASS   Little Agitation Sedation Scale (RASS) 0   Oxygen Therapy   SpO2 95 %   O2 Device None (Room air)     Patient alert and oriented up in recliner and ambulating safely in room independently. Patient was showing staff the bottom of her flip flops and thinks she fell because there is no traction on the bottom of her shoes.    Skin w/d, respirs e/e unlabored, meds given - refused abilify, nurse assessment completed, call light and overbed table within easy reach, no voiced concerns or needs at this time, see flow sheet and MAR. Tiarra Ponce RN (Mandy)

## 2024-05-11 NOTE — PLAN OF CARE
Problem: Safety - Adult  Goal: Free from fall injury  5/11/2024 0813 by Tiarra Ponce RN  Outcome: Progressing  5/10/2024 2235 by Tiarra Brown RN  Outcome: Progressing     Problem: Discharge Planning  Goal: Discharge to home or other facility with appropriate resources  5/11/2024 0813 by Tiarra Ponce RN  Outcome: Progressing  5/10/2024 2235 by Tiarra Brown RN  Outcome: Progressing     Problem: Neurosensory - Adult  Goal: Achieves stable or improved neurological status  5/11/2024 0813 by Tiarra Ponce RN  Outcome: Progressing  5/10/2024 2235 by Tiarra Brown RN  Outcome: Progressing     Problem: Musculoskeletal - Adult  Goal: Return mobility to safest level of function  5/11/2024 0813 by Tiarra Ponce RN  Outcome: Progressing  5/10/2024 2235 by Tiarra Brown RN  Outcome: Progressing

## 2024-05-11 NOTE — PLAN OF CARE
Problem: Safety - Adult  Goal: Free from fall injury  Outcome: Progressing     Problem: Discharge Planning  Goal: Discharge to home or other facility with appropriate resources  Outcome: Progressing     Problem: Neurosensory - Adult  Goal: Achieves stable or improved neurological status  Outcome: Progressing     Problem: Musculoskeletal - Adult  Goal: Return mobility to safest level of function  Outcome: Progressing

## 2024-05-13 LAB — METHYLMALONATE SERPL-SCNC: 0.2 UMOL/L (ref 0–0.4)

## 2024-06-28 ENCOUNTER — HOSPITAL ENCOUNTER (EMERGENCY)
Age: 34
Discharge: HOME | End: 2024-06-29
Attending: STUDENT IN AN ORGANIZED HEALTH CARE EDUCATION/TRAINING PROGRAM
Payer: COMMERCIAL

## 2024-06-28 DIAGNOSIS — R55 SYNCOPE AND COLLAPSE: Primary | ICD-10-CM

## 2024-06-28 PROCEDURE — 99285 EMERGENCY DEPT VISIT HI MDM: CPT

## 2024-06-28 ASSESSMENT — PAIN - FUNCTIONAL ASSESSMENT: PAIN_FUNCTIONAL_ASSESSMENT: NONE - DENIES PAIN

## 2024-06-29 ENCOUNTER — APPOINTMENT (OUTPATIENT)
Dept: CT IMAGING | Age: 34
End: 2024-06-29
Payer: COMMERCIAL

## 2024-06-29 ENCOUNTER — APPOINTMENT (OUTPATIENT)
Dept: GENERAL RADIOLOGY | Age: 34
End: 2024-06-29
Payer: COMMERCIAL

## 2024-06-29 VITALS
RESPIRATION RATE: 17 BRPM | OXYGEN SATURATION: 96 % | TEMPERATURE: 97.2 F | HEART RATE: 83 BPM | SYSTOLIC BLOOD PRESSURE: 118 MMHG | DIASTOLIC BLOOD PRESSURE: 73 MMHG

## 2024-06-29 LAB
ALBUMIN SERPL-MCNC: 4.1 G/DL (ref 3.4–5)
ALBUMIN/GLOB SERPL: 1.6 {RATIO} (ref 1.1–2.2)
ALP SERPL-CCNC: 78 U/L (ref 40–129)
ALT SERPL-CCNC: 35 U/L (ref 10–40)
ANION GAP SERPL CALCULATED.3IONS-SCNC: 9 MMOL/L (ref 3–16)
AST SERPL-CCNC: 26 U/L (ref 15–37)
BASOPHILS # BLD: 0.1 K/UL (ref 0–0.2)
BASOPHILS NFR BLD: 1 %
BILIRUB SERPL-MCNC: <0.2 MG/DL (ref 0–1)
BUN SERPL-MCNC: 14 MG/DL (ref 7–20)
CALCIUM SERPL-MCNC: 9.1 MG/DL (ref 8.3–10.6)
CHLORIDE SERPL-SCNC: 104 MMOL/L (ref 99–110)
CO2 SERPL-SCNC: 26 MMOL/L (ref 21–32)
CREAT SERPL-MCNC: 0.7 MG/DL (ref 0.6–1.1)
DEPRECATED RDW RBC AUTO: 13.3 % (ref 12.4–15.4)
EKG ATRIAL RATE: 83 BPM
EKG DIAGNOSIS: NORMAL
EKG P AXIS: 39 DEGREES
EKG P-R INTERVAL: 162 MS
EKG Q-T INTERVAL: 370 MS
EKG QRS DURATION: 84 MS
EKG QTC CALCULATION (BAZETT): 434 MS
EKG R AXIS: 47 DEGREES
EKG T AXIS: 45 DEGREES
EKG VENTRICULAR RATE: 83 BPM
EOSINOPHIL # BLD: 0.2 K/UL (ref 0–0.6)
EOSINOPHIL NFR BLD: 2.6 %
GFR SERPLBLD CREATININE-BSD FMLA CKD-EPI: >90 ML/MIN/{1.73_M2}
GLUCOSE SERPL-MCNC: 99 MG/DL (ref 70–99)
HCT VFR BLD AUTO: 40.8 % (ref 36–48)
HGB BLD-MCNC: 14.2 G/DL (ref 12–16)
INR PPP: 0.9 (ref 0.85–1.15)
LYMPHOCYTES # BLD: 2.8 K/UL (ref 1–5.1)
LYMPHOCYTES NFR BLD: 31.4 %
MCH RBC QN AUTO: 32 PG (ref 26–34)
MCHC RBC AUTO-ENTMCNC: 34.7 G/DL (ref 31–36)
MCV RBC AUTO: 92.2 FL (ref 80–100)
MONOCYTES # BLD: 0.8 K/UL (ref 0–1.3)
MONOCYTES NFR BLD: 8.7 %
NEUTROPHILS # BLD: 5 K/UL (ref 1.7–7.7)
NEUTROPHILS NFR BLD: 56.3 %
PLATELET # BLD AUTO: 279 K/UL (ref 135–450)
PLATELET BLD QL SMEAR: ADEQUATE
PMV BLD AUTO: 8.9 FL (ref 5–10.5)
POTASSIUM SERPL-SCNC: 4 MMOL/L (ref 3.5–5.1)
PROT SERPL-MCNC: 6.7 G/DL (ref 6.4–8.2)
PROTHROMBIN TIME: 12.3 SEC (ref 11.9–14.9)
RBC # BLD AUTO: 4.42 M/UL (ref 4–5.2)
SLIDE REVIEW: NORMAL
SODIUM SERPL-SCNC: 139 MMOL/L (ref 136–145)
TROPONIN, HIGH SENSITIVITY: <6 NG/L (ref 0–14)
TROPONIN, HIGH SENSITIVITY: <6 NG/L (ref 0–14)
WBC # BLD AUTO: 8.8 K/UL (ref 4–11)

## 2024-06-29 PROCEDURE — 93010 ELECTROCARDIOGRAM REPORT: CPT | Performed by: INTERNAL MEDICINE

## 2024-06-29 PROCEDURE — 72125 CT NECK SPINE W/O DYE: CPT

## 2024-06-29 PROCEDURE — 93005 ELECTROCARDIOGRAM TRACING: CPT | Performed by: STUDENT IN AN ORGANIZED HEALTH CARE EDUCATION/TRAINING PROGRAM

## 2024-06-29 PROCEDURE — 6370000000 HC RX 637 (ALT 250 FOR IP): Performed by: STUDENT IN AN ORGANIZED HEALTH CARE EDUCATION/TRAINING PROGRAM

## 2024-06-29 PROCEDURE — 71045 X-RAY EXAM CHEST 1 VIEW: CPT

## 2024-06-29 PROCEDURE — 84484 ASSAY OF TROPONIN QUANT: CPT

## 2024-06-29 PROCEDURE — 96360 HYDRATION IV INFUSION INIT: CPT

## 2024-06-29 PROCEDURE — 96361 HYDRATE IV INFUSION ADD-ON: CPT

## 2024-06-29 PROCEDURE — 80053 COMPREHEN METABOLIC PANEL: CPT

## 2024-06-29 PROCEDURE — 85025 COMPLETE CBC W/AUTO DIFF WBC: CPT

## 2024-06-29 PROCEDURE — 2580000003 HC RX 258: Performed by: STUDENT IN AN ORGANIZED HEALTH CARE EDUCATION/TRAINING PROGRAM

## 2024-06-29 PROCEDURE — 36415 COLL VENOUS BLD VENIPUNCTURE: CPT

## 2024-06-29 PROCEDURE — 72100 X-RAY EXAM L-S SPINE 2/3 VWS: CPT

## 2024-06-29 PROCEDURE — 85610 PROTHROMBIN TIME: CPT

## 2024-06-29 PROCEDURE — 70450 CT HEAD/BRAIN W/O DYE: CPT

## 2024-06-29 RX ORDER — MECLIZINE HCL 25MG 25 MG/1
50 TABLET, CHEWABLE ORAL ONCE
Status: COMPLETED | OUTPATIENT
Start: 2024-06-29 | End: 2024-06-29

## 2024-06-29 RX ORDER — 0.9 % SODIUM CHLORIDE 0.9 %
1000 INTRAVENOUS SOLUTION INTRAVENOUS ONCE
Status: COMPLETED | OUTPATIENT
Start: 2024-06-29 | End: 2024-06-29

## 2024-06-29 RX ADMIN — SODIUM CHLORIDE 1000 ML: 9 INJECTION, SOLUTION INTRAVENOUS at 01:36

## 2024-06-29 RX ADMIN — MECLIZINE HYDROCHLORIDE 50 MG: 25 TABLET, CHEWABLE ORAL at 02:38

## 2024-06-29 RX ADMIN — SODIUM CHLORIDE 1000 ML: 9 INJECTION, SOLUTION INTRAVENOUS at 02:38

## 2024-06-29 NOTE — ED NOTES
Pt ambulated to the bathroom and walked with writer around department. Pt denies any dizziness at this time. Pt states \"I feel much better and I am ready to go home\" MD made aware.

## 2024-06-29 NOTE — ED PROVIDER NOTES
[facility] ED      CHIEF COMPLAINT  Tachycardia (Pt states she has hx POTS. States today she was making dinner and passed out and BP and HR were elevated. )       HISTORY OF PRESENT ILLNESS  Roxanne Nicholas is a 34 y.o. female  who presents to the ED complaining of a syncopal episode and fall down 2 steps.  Patient states that she does have history of POTS and feels that she is having a \"flare\" recently.  Notes that after she aroused from her syncopal episode her blood pressure and heart rate were both severely elevated.  Denies any recent illness.  No fevers or chills, nausea, vomiting, chest pain, shortness of breath.    No other complaints, modifying factors or associated symptoms.     I have reviewed the following from the nursing documentation.    Past Medical History:   Diagnosis Date    Alpha-1-antitrypsin deficiency (HCC)     Asthma     Bronchopulmonary dysplasia of      Heart murmur     History of gallstones     PFO (patent foramen ovale)      Past Surgical History:   Procedure Laterality Date    ADENOIDECTOMY      CARDIAC SURGERY      PDA coiling    CHOLECYSTECTOMY  2011    HYSTERECTOMY (CERVIX STATUS UNKNOWN)  2017    Robotic Assisted TLH & BS    TONSILLECTOMY       History reviewed. No pertinent family history.  Social History     Socioeconomic History    Marital status:      Spouse name: Not on file    Number of children: Not on file    Years of education: Not on file    Highest education level: Not on file   Occupational History    Not on file   Tobacco Use    Smoking status: Never    Smokeless tobacco: Never    Tobacco comments:     2X YEAR   Substance and Sexual Activity    Alcohol use: Yes     Comment: social    Drug use: No    Sexual activity: Not on file   Other Topics Concern    Not on file   Social History Narrative    Not on file     Social Determinants of Health     Financial Resource Strain: Low Risk  (2022)    Overall Financial Resource Strain

## 2024-09-06 ENCOUNTER — APPOINTMENT (OUTPATIENT)
Dept: GENERAL RADIOLOGY | Age: 34
End: 2024-09-06
Payer: COMMERCIAL

## 2024-09-06 ENCOUNTER — APPOINTMENT (OUTPATIENT)
Dept: CT IMAGING | Age: 34
End: 2024-09-06
Payer: COMMERCIAL

## 2024-09-06 ENCOUNTER — HOSPITAL ENCOUNTER (EMERGENCY)
Age: 34
Discharge: HOME | End: 2024-09-06
Payer: COMMERCIAL

## 2024-09-06 VITALS
OXYGEN SATURATION: 100 % | HEART RATE: 83 BPM | TEMPERATURE: 98.5 F | BODY MASS INDEX: 28.16 KG/M2 | SYSTOLIC BLOOD PRESSURE: 98 MMHG | RESPIRATION RATE: 18 BRPM | WEIGHT: 169 LBS | DIASTOLIC BLOOD PRESSURE: 67 MMHG | HEIGHT: 65 IN

## 2024-09-06 DIAGNOSIS — E86.9 VOLUME DEPLETION: ICD-10-CM

## 2024-09-06 DIAGNOSIS — U07.1 COVID-19: Primary | ICD-10-CM

## 2024-09-06 LAB
ALBUMIN SERPL-MCNC: 4.1 G/DL (ref 3.4–5)
ALBUMIN/GLOB SERPL: 1.6 {RATIO} (ref 1.1–2.2)
ALP SERPL-CCNC: 71 U/L (ref 40–129)
ALT SERPL-CCNC: 17 U/L (ref 10–40)
ANION GAP SERPL CALCULATED.3IONS-SCNC: 9 MMOL/L (ref 3–16)
AST SERPL-CCNC: 16 U/L (ref 15–37)
BASE EXCESS BLDV CALC-SCNC: -3.4 MMOL/L (ref -3–3)
BASOPHILS # BLD: 0 K/UL (ref 0–0.2)
BASOPHILS NFR BLD: 0.3 %
BILIRUB SERPL-MCNC: 0.3 MG/DL (ref 0–1)
BUN SERPL-MCNC: 11 MG/DL (ref 7–20)
CALCIUM SERPL-MCNC: 9.2 MG/DL (ref 8.3–10.6)
CHLORIDE SERPL-SCNC: 106 MMOL/L (ref 99–110)
CO2 BLDV-SCNC: 21 MMOL/L
CO2 SERPL-SCNC: 27 MMOL/L (ref 21–32)
COHGB MFR BLDV: 0.9 % (ref 0–1.5)
CREAT SERPL-MCNC: 1.1 MG/DL (ref 0.6–1.1)
D-DIMER QUANTITATIVE: 0.32 UG/ML FEU (ref 0–0.6)
DEPRECATED RDW RBC AUTO: 12.7 % (ref 12.4–15.4)
EKG ATRIAL RATE: 97 BPM
EKG DIAGNOSIS: NORMAL
EKG P AXIS: 60 DEGREES
EKG P-R INTERVAL: 144 MS
EKG Q-T INTERVAL: 338 MS
EKG QRS DURATION: 64 MS
EKG QTC CALCULATION (BAZETT): 429 MS
EKG R AXIS: 38 DEGREES
EKG T AXIS: 32 DEGREES
EKG VENTRICULAR RATE: 97 BPM
EOSINOPHIL # BLD: 0.1 K/UL (ref 0–0.6)
EOSINOPHIL NFR BLD: 1.3 %
GFR SERPLBLD CREATININE-BSD FMLA CKD-EPI: 68 ML/MIN/{1.73_M2}
GLUCOSE BLD-MCNC: 91 MG/DL (ref 70–99)
GLUCOSE SERPL-MCNC: 90 MG/DL (ref 70–99)
HCO3 BLDV-SCNC: 20.3 MMOL/L (ref 23–29)
HCT VFR BLD AUTO: 42.4 % (ref 36–48)
HGB BLD-MCNC: 14.4 G/DL (ref 12–16)
LACTATE BLDV-SCNC: 1.9 MMOL/L (ref 0.4–1.9)
LYMPHOCYTES # BLD: 1.5 K/UL (ref 1–5.1)
LYMPHOCYTES NFR BLD: 18.8 %
MCH RBC QN AUTO: 31.3 PG (ref 26–34)
MCHC RBC AUTO-ENTMCNC: 34.1 G/DL (ref 31–36)
MCV RBC AUTO: 92 FL (ref 80–100)
METHGB MFR BLDV: 0.3 %
MONOCYTES # BLD: 0.8 K/UL (ref 0–1.3)
MONOCYTES NFR BLD: 10.4 %
NEUTROPHILS # BLD: 5.6 K/UL (ref 1.7–7.7)
NEUTROPHILS NFR BLD: 69.2 %
NT-PROBNP SERPL-MCNC: 83 PG/ML (ref 0–124)
O2 CT VFR BLDV CALC: 18 VOL %
O2 THERAPY: ABNORMAL
PCO2 BLDV: 32.9 MMHG (ref 40–50)
PERFORMED ON: NORMAL
PH BLDV: 7.41 [PH] (ref 7.35–7.45)
PLATELET # BLD AUTO: 249 K/UL (ref 135–450)
PMV BLD AUTO: 9 FL (ref 5–10.5)
PO2 BLDV: 56.2 MMHG (ref 25–40)
POTASSIUM SERPL-SCNC: 3.9 MMOL/L (ref 3.5–5.1)
PROT SERPL-MCNC: 6.6 G/DL (ref 6.4–8.2)
RBC # BLD AUTO: 4.61 M/UL (ref 4–5.2)
SAO2 % BLDV: 90 %
SARS-COV-2 RDRP RESP QL NAA+PROBE: DETECTED
SODIUM SERPL-SCNC: 142 MMOL/L (ref 136–145)
TROPONIN, HIGH SENSITIVITY: <6 NG/L (ref 0–14)
WBC # BLD AUTO: 8.1 K/UL (ref 4–11)

## 2024-09-06 PROCEDURE — 71045 X-RAY EXAM CHEST 1 VIEW: CPT

## 2024-09-06 PROCEDURE — 83880 ASSAY OF NATRIURETIC PEPTIDE: CPT

## 2024-09-06 PROCEDURE — 87635 SARS-COV-2 COVID-19 AMP PRB: CPT

## 2024-09-06 PROCEDURE — 70450 CT HEAD/BRAIN W/O DYE: CPT

## 2024-09-06 PROCEDURE — 84484 ASSAY OF TROPONIN QUANT: CPT

## 2024-09-06 PROCEDURE — 80053 COMPREHEN METABOLIC PANEL: CPT

## 2024-09-06 PROCEDURE — 83605 ASSAY OF LACTIC ACID: CPT

## 2024-09-06 PROCEDURE — 85379 FIBRIN DEGRADATION QUANT: CPT

## 2024-09-06 PROCEDURE — 93005 ELECTROCARDIOGRAM TRACING: CPT | Performed by: STUDENT IN AN ORGANIZED HEALTH CARE EDUCATION/TRAINING PROGRAM

## 2024-09-06 PROCEDURE — 82803 BLOOD GASES ANY COMBINATION: CPT

## 2024-09-06 PROCEDURE — 99285 EMERGENCY DEPT VISIT HI MDM: CPT

## 2024-09-06 PROCEDURE — 85025 COMPLETE CBC W/AUTO DIFF WBC: CPT

## 2024-09-06 PROCEDURE — 93010 ELECTROCARDIOGRAM REPORT: CPT | Performed by: INTERNAL MEDICINE

## 2024-09-06 ASSESSMENT — PAIN SCALES - GENERAL: PAINLEVEL_OUTOF10: 6

## 2024-09-06 ASSESSMENT — PAIN DESCRIPTION - LOCATION: LOCATION: CHEST

## 2024-09-06 ASSESSMENT — PAIN - FUNCTIONAL ASSESSMENT: PAIN_FUNCTIONAL_ASSESSMENT: 0-10

## 2024-09-06 ASSESSMENT — PAIN DESCRIPTION - DESCRIPTORS: DESCRIPTORS: TIGHTNESS

## 2024-09-06 NOTE — ED PROVIDER NOTES
I independently reviewed the ECG as follows:    Sinus rhythm at a rate of 97 without ectopy.  Normal axis and intervals.  No evidence of acute ischemia.  EKG does not appear significantly changed from prior dated June 29, 2024.    Please reference my attending note if I had further involvement in the care of this patient otherwise I did not participate in the care of this patient beyond evaluation of this ECG.  Please reference the PARAMJIT's documentation for further details.        Matthew Hastings MD  09/06/24 2962

## 2024-09-06 NOTE — ED NOTES
--Patient provided with discharge instructions and any prescriptions.   --Instructions, dosing, and follow-up appointments reviewed with patient/family. No further questions or needs at this time.   --Vital signs and patient stable upon discharge.  --Patient ambulatory to [facility].

## 2024-09-09 NOTE — ED PROVIDER NOTES
[facility] ED  EMERGENCY DEPARTMENT ENCOUNTER        Pt Name: Roxanne Nicholas  MRN: [id number]  Birthdate 1990  Date of evaluation: 9/6/2024  Provider: ANASTACIA Thompson - RICHARD  PCP: Timur Shea MD  Note Started: 9:53 AM EDT 9/9/24      PARAMJIT. I have evaluated this patient.        CHIEF COMPLAINT       Chief Complaint   Patient presents with    Dizziness     For two days. States worse today     Shortness of Breath       HISTORY OF PRESENT ILLNESS: 1 or more Elements     History From: Patient     Limitations to history : None    Social Determinants Significantly Affecting Health : None    Chief Complaint: Lightheaded, cough, general feeling unwell.     Roxanne Nicholas is a 34 y.o. female who presents to the Emergency Department today with symptoms of cough, general feeling unwell, some occasional lightheadedness.  Denies any shortness of breath or difficulty breathing at this time.  Reported that she felt shortness of breath mildly with cough exacerbation.  No neck pain or back pain.  Does have a history of asthma.  Denies chest pain.  No abdominal discomfort or vomiting.    Nursing Notes were all reviewed and agreed with or any disagreements were addressed in the HPI.    REVIEW OF SYSTEMS :      Review of Systems    Positives and Pertinent negatives as per HPI.     SURGICAL HISTORY     Past Surgical History:   Procedure Laterality Date    ADENOIDECTOMY      CARDIAC SURGERY      PDA coiling    CHOLECYSTECTOMY  11/07/2011    HYSTERECTOMY (CERVIX STATUS UNKNOWN)  01/24/2017    Robotic Assisted TLH & BS    TONSILLECTOMY         CURRENTMEDICATIONS       Discharge Medication List as of 9/6/2024  6:09 PM        CONTINUE these medications which have NOT CHANGED    Details   ARIPiprazole (ABILIFY) 5 MG tablet Take 1 tablet by mouth dailyHistorical Med      cloNIDine (CATAPRES) 0.1 MG tablet Take 0.5-1 tablets by mouth 2 times daily as needed for Other (sleep/ anxiety)Historical Med

## 2024-09-15 ENCOUNTER — HOSPITAL ENCOUNTER (EMERGENCY)
Age: 34
Discharge: HOME | End: 2024-09-15
Payer: COMMERCIAL

## 2024-09-15 ENCOUNTER — APPOINTMENT (OUTPATIENT)
Dept: CT IMAGING | Age: 34
End: 2024-09-15
Payer: COMMERCIAL

## 2024-09-15 ENCOUNTER — APPOINTMENT (OUTPATIENT)
Dept: GENERAL RADIOLOGY | Age: 34
End: 2024-09-15
Payer: COMMERCIAL

## 2024-09-15 VITALS
WEIGHT: 170 LBS | HEIGHT: 64 IN | BODY MASS INDEX: 29.02 KG/M2 | TEMPERATURE: 98.1 F | DIASTOLIC BLOOD PRESSURE: 71 MMHG | OXYGEN SATURATION: 98 % | SYSTOLIC BLOOD PRESSURE: 133 MMHG | HEART RATE: 84 BPM | RESPIRATION RATE: 18 BRPM

## 2024-09-15 DIAGNOSIS — T74.91XA DOMESTIC VIOLENCE OF ADULT, INITIAL ENCOUNTER: Primary | ICD-10-CM

## 2024-09-15 DIAGNOSIS — N39.0 ACUTE URINARY TRACT INFECTION: ICD-10-CM

## 2024-09-15 LAB
ALBUMIN SERPL-MCNC: 4.1 G/DL (ref 3.4–5)
ALBUMIN/GLOB SERPL: 1.5 {RATIO} (ref 1.1–2.2)
ALP SERPL-CCNC: 72 U/L (ref 40–129)
ALT SERPL-CCNC: 20 U/L (ref 10–40)
ANION GAP SERPL CALCULATED.3IONS-SCNC: 9 MMOL/L (ref 3–16)
AST SERPL-CCNC: 15 U/L (ref 15–37)
BACTERIA URNS QL MICRO: ABNORMAL /HPF
BASOPHILS # BLD: 0 K/UL (ref 0–0.2)
BASOPHILS NFR BLD: 0.5 %
BILIRUB SERPL-MCNC: 0.4 MG/DL (ref 0–1)
BILIRUB UR QL STRIP.AUTO: NEGATIVE
BUN SERPL-MCNC: 12 MG/DL (ref 7–20)
CALCIUM SERPL-MCNC: 9.1 MG/DL (ref 8.3–10.6)
CHLORIDE SERPL-SCNC: 104 MMOL/L (ref 99–110)
CLARITY UR: ABNORMAL
CO2 SERPL-SCNC: 27 MMOL/L (ref 21–32)
COLOR UR: YELLOW
CREAT SERPL-MCNC: 0.7 MG/DL (ref 0.6–1.1)
DEPRECATED RDW RBC AUTO: 12.9 % (ref 12.4–15.4)
EOSINOPHIL # BLD: 0.1 K/UL (ref 0–0.6)
EOSINOPHIL NFR BLD: 1.2 %
EPI CELLS #/AREA URNS HPF: ABNORMAL /HPF (ref 0–5)
GFR SERPLBLD CREATININE-BSD FMLA CKD-EPI: >90 ML/MIN/{1.73_M2}
GLUCOSE SERPL-MCNC: 91 MG/DL (ref 70–99)
GLUCOSE UR STRIP.AUTO-MCNC: NEGATIVE MG/DL
HCG SERPL QL: NEGATIVE
HCT VFR BLD AUTO: 41.7 % (ref 36–48)
HGB BLD-MCNC: 14.4 G/DL (ref 12–16)
HGB UR QL STRIP.AUTO: ABNORMAL
KETONES UR STRIP.AUTO-MCNC: NEGATIVE MG/DL
LEUKOCYTE ESTERASE UR QL STRIP.AUTO: ABNORMAL
LYMPHOCYTES # BLD: 1.8 K/UL (ref 1–5.1)
LYMPHOCYTES NFR BLD: 20.8 %
MCH RBC QN AUTO: 31.4 PG (ref 26–34)
MCHC RBC AUTO-ENTMCNC: 34.4 G/DL (ref 31–36)
MCV RBC AUTO: 91.2 FL (ref 80–100)
MONOCYTES # BLD: 0.7 K/UL (ref 0–1.3)
MONOCYTES NFR BLD: 8.6 %
MUCOUS THREADS #/AREA URNS LPF: ABNORMAL /LPF
NEUTROPHILS # BLD: 5.8 K/UL (ref 1.7–7.7)
NEUTROPHILS NFR BLD: 68.9 %
NITRITE UR QL STRIP.AUTO: NEGATIVE
PH UR STRIP.AUTO: 6 [PH] (ref 5–8)
PLATELET # BLD AUTO: 251 K/UL (ref 135–450)
PMV BLD AUTO: 8.7 FL (ref 5–10.5)
POTASSIUM SERPL-SCNC: 3.7 MMOL/L (ref 3.5–5.1)
PROT SERPL-MCNC: 6.9 G/DL (ref 6.4–8.2)
PROT UR STRIP.AUTO-MCNC: ABNORMAL MG/DL
RBC # BLD AUTO: 4.57 M/UL (ref 4–5.2)
RBC #/AREA URNS HPF: >100 /HPF (ref 0–4)
SODIUM SERPL-SCNC: 140 MMOL/L (ref 136–145)
SP GR UR STRIP.AUTO: >=1.03 (ref 1–1.03)
TROPONIN, HIGH SENSITIVITY: <6 NG/L (ref 0–14)
UA COMPLETE W REFLEX CULTURE PNL UR: YES
UA DIPSTICK W REFLEX MICRO PNL UR: YES
URN SPEC COLLECT METH UR: ABNORMAL
UROBILINOGEN UR STRIP-ACNC: 0.2 E.U./DL
WBC # BLD AUTO: 8.5 K/UL (ref 4–11)
WBC #/AREA URNS HPF: >100 /HPF (ref 0–5)

## 2024-09-15 PROCEDURE — 84703 CHORIONIC GONADOTROPIN ASSAY: CPT

## 2024-09-15 PROCEDURE — 84484 ASSAY OF TROPONIN QUANT: CPT

## 2024-09-15 PROCEDURE — 87086 URINE CULTURE/COLONY COUNT: CPT

## 2024-09-15 PROCEDURE — 70491 CT SOFT TISSUE NECK W/DYE: CPT

## 2024-09-15 PROCEDURE — 73090 X-RAY EXAM OF FOREARM: CPT

## 2024-09-15 PROCEDURE — 81001 URINALYSIS AUTO W/SCOPE: CPT

## 2024-09-15 PROCEDURE — 99285 EMERGENCY DEPT VISIT HI MDM: CPT

## 2024-09-15 PROCEDURE — 73130 X-RAY EXAM OF HAND: CPT

## 2024-09-15 PROCEDURE — 72125 CT NECK SPINE W/O DYE: CPT

## 2024-09-15 PROCEDURE — 73552 X-RAY EXAM OF FEMUR 2/>: CPT

## 2024-09-15 PROCEDURE — 6370000000 HC RX 637 (ALT 250 FOR IP)

## 2024-09-15 PROCEDURE — 70450 CT HEAD/BRAIN W/O DYE: CPT

## 2024-09-15 PROCEDURE — 80053 COMPREHEN METABOLIC PANEL: CPT

## 2024-09-15 PROCEDURE — 85025 COMPLETE CBC W/AUTO DIFF WBC: CPT

## 2024-09-15 PROCEDURE — 93005 ELECTROCARDIOGRAM TRACING: CPT

## 2024-09-15 PROCEDURE — 6360000004 HC RX CONTRAST MEDICATION

## 2024-09-15 RX ORDER — LIDOCAINE 4 G/G
1 PATCH TOPICAL ONCE
Status: DISCONTINUED | OUTPATIENT
Start: 2024-09-15 | End: 2024-09-15 | Stop reason: HOSPADM

## 2024-09-15 RX ORDER — ACETAMINOPHEN 500 MG
1000 TABLET ORAL ONCE
Status: COMPLETED | OUTPATIENT
Start: 2024-09-15 | End: 2024-09-15

## 2024-09-15 RX ORDER — CEPHALEXIN 500 MG/1
500 CAPSULE ORAL ONCE
Status: DISCONTINUED | OUTPATIENT
Start: 2024-09-15 | End: 2024-09-15 | Stop reason: HOSPADM

## 2024-09-15 RX ORDER — IOPAMIDOL 755 MG/ML
75 INJECTION, SOLUTION INTRAVASCULAR
Status: COMPLETED | OUTPATIENT
Start: 2024-09-15 | End: 2024-09-15

## 2024-09-15 RX ADMIN — ACETAMINOPHEN 1000 MG: 500 TABLET ORAL at 18:44

## 2024-09-15 RX ADMIN — IOPAMIDOL 75 ML: 755 INJECTION, SOLUTION INTRAVENOUS at 19:41

## 2024-09-15 ASSESSMENT — PAIN - FUNCTIONAL ASSESSMENT: PAIN_FUNCTIONAL_ASSESSMENT: 0-10

## 2024-09-15 ASSESSMENT — PAIN SCALES - GENERAL: PAINLEVEL_OUTOF10: 8

## 2024-09-16 LAB
BACTERIA UR CULT: NORMAL
EKG ATRIAL RATE: 77 BPM
EKG DIAGNOSIS: NORMAL
EKG P AXIS: 51 DEGREES
EKG P-R INTERVAL: 144 MS
EKG Q-T INTERVAL: 378 MS
EKG QRS DURATION: 88 MS
EKG QTC CALCULATION (BAZETT): 427 MS
EKG R AXIS: 41 DEGREES
EKG T AXIS: 42 DEGREES
EKG VENTRICULAR RATE: 77 BPM

## 2024-09-16 PROCEDURE — 93010 ELECTROCARDIOGRAM REPORT: CPT | Performed by: INTERNAL MEDICINE

## 2024-09-18 ASSESSMENT — ENCOUNTER SYMPTOMS
VOMITING: 0
ABDOMINAL PAIN: 0
NAUSEA: 0
SHORTNESS OF BREATH: 0
CHEST TIGHTNESS: 0
TROUBLE SWALLOWING: 0

## 2024-09-28 ENCOUNTER — HOSPITAL ENCOUNTER (EMERGENCY)
Age: 34
Discharge: HOME | End: 2024-09-28
Attending: EMERGENCY MEDICINE
Payer: COMMERCIAL

## 2024-09-28 VITALS
BODY MASS INDEX: 29.02 KG/M2 | OXYGEN SATURATION: 98 % | HEART RATE: 100 BPM | HEIGHT: 64 IN | TEMPERATURE: 98.5 F | SYSTOLIC BLOOD PRESSURE: 122 MMHG | RESPIRATION RATE: 16 BRPM | DIASTOLIC BLOOD PRESSURE: 74 MMHG | WEIGHT: 170 LBS

## 2024-09-28 DIAGNOSIS — K04.7 DENTAL INFECTION: ICD-10-CM

## 2024-09-28 DIAGNOSIS — K08.89 PAIN, DENTAL: Primary | ICD-10-CM

## 2024-09-28 PROCEDURE — 99283 EMERGENCY DEPT VISIT LOW MDM: CPT

## 2024-09-28 RX ORDER — AMOXICILLIN AND CLAVULANATE POTASSIUM 600; 42.9 MG/5ML; MG/5ML
875 POWDER, FOR SUSPENSION ORAL 2 TIMES DAILY
Qty: 146 ML | Refills: 0 | Status: SHIPPED | OUTPATIENT
Start: 2024-09-28 | End: 2024-10-08

## 2024-09-28 RX ORDER — KETOROLAC TROMETHAMINE 10 MG/1
10 TABLET, FILM COATED ORAL EVERY 8 HOURS PRN
Qty: 15 TABLET | Refills: 0 | Status: SHIPPED | OUTPATIENT
Start: 2024-09-28

## 2024-09-28 ASSESSMENT — PAIN DESCRIPTION - LOCATION: LOCATION: MOUTH

## 2024-09-28 ASSESSMENT — PAIN SCALES - GENERAL: PAINLEVEL_OUTOF10: 6

## 2024-09-28 ASSESSMENT — PAIN DESCRIPTION - PAIN TYPE: TYPE: ACUTE PAIN

## 2024-09-28 ASSESSMENT — PAIN DESCRIPTION - FREQUENCY: FREQUENCY: CONTINUOUS

## 2024-09-28 ASSESSMENT — PAIN - FUNCTIONAL ASSESSMENT: PAIN_FUNCTIONAL_ASSESSMENT: 0-10

## 2024-09-28 ASSESSMENT — PAIN DESCRIPTION - ORIENTATION: ORIENTATION: LEFT

## 2024-09-28 NOTE — ED PROVIDER NOTES
EMERGENCY MEDICINE PROVIDER NOTE    Patient Identification  Pt Name: Roxanne Nicholas  MRN: [id number]  Birthdate 1990  Date of evaluation: 2024  Provider: Salvador Johnson MD  PCP: Timur Shea MD    Chief Complaint  Facial Swelling (Right side facial swelling that started Friday, difficulty with swallowing)      HPI  (History provided by patient)  This is a 34 y.o. female who was brought in by self for dental pain, left mandibular, facial swelling.  States she has had some right-sided facial swelling since yesterday and dental pain.  States she does have ongoing dental pain and has had similar symptoms in the past that have been improved with antibiotics.  States she does have some pain with swallowing but is able to tolerate both liquids and solids.  Has taken Augmentin in the past with improvement.  Does have an appointment scheduled with her dentist but has not been able to see them recently.  Does have some associated nausea without emesis.  Denies other symptoms including measured fevers, shortness of breath, vomiting.  Past surgical history of hysterectomy.      I have reviewed the following nursing documentation:  Allergies: Bactrim [sulfamethoxazole-trimethoprim] and Penicillins    Past medical history:   Past Medical History:   Diagnosis Date    Alpha-1-antitrypsin deficiency (HCC)     Asthma     Bronchopulmonary dysplasia of      Heart murmur     History of gallstones     PFO (patent foramen ovale)      Past surgical history:   Past Surgical History:   Procedure Laterality Date    ADENOIDECTOMY      CARDIAC SURGERY      PDA coiling    CHOLECYSTECTOMY  2011    HYSTERECTOMY (CERVIX STATUS UNKNOWN)  2017    Robotic Assisted TLH & BS    TONSILLECTOMY       Social history:  reports that she has never smoked. She has never used smokeless tobacco. She reports current alcohol use. She reports that she does not use drugs.  Family history:  No family history on file.    Home    ARIPiprazole (ABILIFY) 5 MG tablet   Yes No   Sig: Take 1 tablet by mouth daily   albuterol (PROVENTIL) (2.5 MG/3ML) 0.083% nebulizer solution   No No   Sig: Take 3 mLs by nebulization every 4 hours as needed for Wheezing   cloNIDine (CATAPRES) 0.1 MG tablet   Yes No   Sig: Take 0.5-1 tablets by mouth 2 times daily as needed for Other (sleep/ anxiety)   fluticasone (FLONASE) 50 MCG/ACT nasal spray   No No   Si sprays by Each Nostril route daily   mometasone-formoterol (DULERA) 100-5 MCG/ACT inhaler   No No   Sig: Inhale 2 puffs into the lungs 2 times daily   tiotropium (SPIRIVA HANDIHALER) 18 MCG inhalation capsule   No No   Sig: Inhale 1 capsule into the lungs daily   traZODone (DESYREL) 50 MG tablet   Yes No   Sig: Take 1.5 tablets by mouth nightly      Facility-Administered Medications: None         Exam  ED Triage Vitals   BP Systolic BP Percentile Diastolic BP Percentile Temp Temp src Pulse Resp SpO2   -- -- -- -- -- -- -- --      Height Weight         -- --           Nursing note and vitals reviewed.  General: ***  Head: ***  ENT: ***  Eyes: Anicteric sclera. No discharge.   Neck: Supple. Trachea midline.   Cardiovascular: RRR; ***  Pulmonary/Chest: Effort normal. No respiratory distress. ***  Abdominal: Soft. No distension. ***  : ***  Rectal: ***   Musculoskeletal: ***  Neurological: Alert and oriented. Face symmetric. Speech is clear.  Skin: Warm and dry. No rash.    Procedures      EKG  The Ekg interpreted by me in the absence of a cardiologist shows.  {Exam; heart rhythm:55247} with a rate of ***  Axis is   {Left-right-normal:76750}  QTc is  {normal/acceptable:36945}  Intervals and Durations are unremarkable.      No specific ST-T wave changes appreciated.  No evidence of acute ischemia.   No significant change from prior EKG dated ***    Radiology  No orders to display       Labs  No results found for this visit on 24.    SEP-1  Is this patient to be included in the SEP-1 Core Measure due

## 2024-09-28 NOTE — DISCHARGE INSTRUCTIONS
Please return emergency department for new or worsening symptoms including but not limited to: Failure symptoms to improve with antibiotics, difficulty breathing or swallowing.  Please follow-up with dentistry as soon as possible.

## 2024-10-23 ENCOUNTER — HOSPITAL ENCOUNTER (EMERGENCY)
Age: 34
Discharge: HOME | End: 2024-10-23
Payer: COMMERCIAL

## 2024-10-23 VITALS
HEART RATE: 76 BPM | OXYGEN SATURATION: 97 % | WEIGHT: 170 LBS | SYSTOLIC BLOOD PRESSURE: 108 MMHG | HEIGHT: 64 IN | BODY MASS INDEX: 29.02 KG/M2 | DIASTOLIC BLOOD PRESSURE: 58 MMHG | RESPIRATION RATE: 16 BRPM | TEMPERATURE: 97.4 F
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
## 2024-10-23 DIAGNOSIS — S01.511A LIP LACERATION, INITIAL ENCOUNTER: Primary | ICD-10-CM

## 2024-10-23 PROCEDURE — 99283 EMERGENCY DEPT VISIT LOW MDM: CPT

## 2024-10-23 ASSESSMENT — ENCOUNTER SYMPTOMS
FACIAL SWELLING: 0
COLOR CHANGE: 0
SHORTNESS OF BREATH: 0
ABDOMINAL PAIN: 0
RHINORRHEA: 0
SORE THROAT: 0

## 2024-10-23 ASSESSMENT — PAIN - FUNCTIONAL ASSESSMENT: PAIN_FUNCTIONAL_ASSESSMENT: 0-10

## 2024-10-23 ASSESSMENT — PAIN DESCRIPTION - LOCATION: LOCATION: MOUTH

## 2024-10-23 ASSESSMENT — PAIN SCALES - GENERAL: PAINLEVEL_OUTOF10: 6

## 2024-10-23 NOTE — ED NOTES
Discharge instructions reviewed with patient and she verbalizes understanding. Patient ambulatory out of the ED without assistance.

## 2024-10-23 NOTE — ED TRIAGE NOTES
Patient presents to the ED via EMS from home with c/o fall, lip laceration. Patient states she tripped over her dog. No LOC. Not on thinners.

## 2024-10-23 NOTE — ED PROVIDER NOTES
[facility] ED  EMERGENCY DEPARTMENT ENCOUNTER      I am the Primary Clinician of Record    Note started: 2:39 PM EDT 10/23/24    PARAMJIT. I have evaluated this patient.          Pt Name: Roxanne Nicholas  MRN: [id number]  Birthdate 1990  Dateof evaluation: 10/23/2024  Provider: Dianne Donahue, APRN - CNP  PCP: Timur Shea MD  ED Attending: No att. providers found      CHIEF COMPLAINT       Chief Complaint   Patient presents with    Laceration       HISTORY OF PRESENTILLNESS   (Location/Symptom, Timing/Onset, Context/Setting, Quality, Duration, Modifying Factors, Severity)  Note limiting factors.     Roxanne Nicholas is a 34 y.o. female for lip laceration. Onset was today.  Context includes patient reports that she tripped over her dog injuring her face.  She states that she has a laceration to her left lower lip and now has some bruising to her left eye.  She denies any loss of consciousness or use of blood thinners.  Patient reports that the police were called by her daughter.  Patient states that she does have a safe place to go at discharge.  She also reports that she is safe at home. Alleviating factors include nothing.  Aggravating factors include nothing. Pain is 6/10.  Nothing has been used for pain today.     Nursing Notes were all reviewed and agreed with or any disagreements were addressed  in the HPI.      REVIEW OF SYSTEMS       Review of Systems   Constitutional:  Negative for activity change, appetite change and fever.   HENT:  Negative for congestion, facial swelling, rhinorrhea and sore throat.    Eyes:  Negative for visual disturbance.   Respiratory:  Negative for shortness of breath.    Cardiovascular:  Negative for chest pain.   Gastrointestinal:  Negative for abdominal pain.   Genitourinary:  Negative for difficulty urinating.   Musculoskeletal:  Negative for arthralgias and myalgias.   Skin:  Positive for wound. Negative for color change and rash.

## 2024-11-03 ENCOUNTER — HOSPITAL ENCOUNTER (EMERGENCY)
Age: 34
Discharge: ANOTHER HOSPITAL | End: 2024-11-04
Attending: STUDENT IN AN ORGANIZED HEALTH CARE EDUCATION/TRAINING PROGRAM
Payer: COMMERCIAL

## 2024-11-03 DIAGNOSIS — M51.26 LUMBAR DISC HERNIATION: Primary | ICD-10-CM

## 2024-11-03 LAB
BASOPHILS # BLD: 0 K/UL (ref 0–0.2)
BASOPHILS NFR BLD: 0.4 %
DEPRECATED RDW RBC AUTO: 13.4 % (ref 12.4–15.4)
EOSINOPHIL # BLD: 0.2 K/UL (ref 0–0.6)
EOSINOPHIL NFR BLD: 1.9 %
HCT VFR BLD AUTO: 39.5 % (ref 36–48)
HGB BLD-MCNC: 13.5 G/DL (ref 12–16)
LYMPHOCYTES # BLD: 2.5 K/UL (ref 1–5.1)
LYMPHOCYTES NFR BLD: 30.4 %
MCH RBC QN AUTO: 31.5 PG (ref 26–34)
MCHC RBC AUTO-ENTMCNC: 34.1 G/DL (ref 31–36)
MCV RBC AUTO: 92.5 FL (ref 80–100)
MONOCYTES # BLD: 0.8 K/UL (ref 0–1.3)
MONOCYTES NFR BLD: 9.2 %
NEUTROPHILS # BLD: 4.7 K/UL (ref 1.7–7.7)
NEUTROPHILS NFR BLD: 58.1 %
PLATELET # BLD AUTO: 248 K/UL (ref 135–450)
PMV BLD AUTO: 8.8 FL (ref 5–10.5)
RBC # BLD AUTO: 4.27 M/UL (ref 4–5.2)
WBC # BLD AUTO: 8.2 K/UL (ref 4–11)

## 2024-11-03 PROCEDURE — 83690 ASSAY OF LIPASE: CPT

## 2024-11-03 PROCEDURE — 80053 COMPREHEN METABOLIC PANEL: CPT

## 2024-11-03 PROCEDURE — 85025 COMPLETE CBC W/AUTO DIFF WBC: CPT

## 2024-11-03 PROCEDURE — 84703 CHORIONIC GONADOTROPIN ASSAY: CPT

## 2024-11-03 PROCEDURE — 99285 EMERGENCY DEPT VISIT HI MDM: CPT

## 2024-11-03 RX ORDER — IOPAMIDOL 755 MG/ML
75 INJECTION, SOLUTION INTRAVASCULAR
Status: COMPLETED | OUTPATIENT
Start: 2024-11-03 | End: 2024-11-04

## 2024-11-04 ENCOUNTER — APPOINTMENT (OUTPATIENT)
Dept: MRI IMAGING | Age: 34
End: 2024-11-04
Payer: COMMERCIAL

## 2024-11-04 ENCOUNTER — APPOINTMENT (OUTPATIENT)
Dept: CT IMAGING | Age: 34
End: 2024-11-04
Payer: COMMERCIAL

## 2024-11-04 ENCOUNTER — HOSPITAL ENCOUNTER (INPATIENT)
Age: 34
LOS: 3 days | Discharge: NURSING, REHAB OR LONG-TERM CARE | End: 2024-11-07
Attending: STUDENT IN AN ORGANIZED HEALTH CARE EDUCATION/TRAINING PROGRAM | Admitting: STUDENT IN AN ORGANIZED HEALTH CARE EDUCATION/TRAINING PROGRAM
Payer: COMMERCIAL

## 2024-11-04 ENCOUNTER — APPOINTMENT (OUTPATIENT)
Dept: MRI IMAGING | Age: 34
End: 2024-11-04
Attending: STUDENT IN AN ORGANIZED HEALTH CARE EDUCATION/TRAINING PROGRAM
Payer: COMMERCIAL

## 2024-11-04 VITALS
SYSTOLIC BLOOD PRESSURE: 94 MMHG | RESPIRATION RATE: 16 BRPM | OXYGEN SATURATION: 100 % | HEART RATE: 75 BPM | TEMPERATURE: 97.7 F | DIASTOLIC BLOOD PRESSURE: 61 MMHG

## 2024-11-04 DIAGNOSIS — M51.16 LUMBAR DISC HERNIATION WITH RADICULOPATHY: Primary | ICD-10-CM

## 2024-11-04 LAB
ALBUMIN SERPL-MCNC: 3.6 G/DL (ref 3.4–5)
ALBUMIN SERPL-MCNC: 3.9 G/DL (ref 3.4–5)
ALBUMIN/GLOB SERPL: 1.4 {RATIO} (ref 1.1–2.2)
ALBUMIN/GLOB SERPL: 2 {RATIO} (ref 1.1–2.2)
ALP SERPL-CCNC: 65 U/L (ref 40–129)
ALP SERPL-CCNC: 67 U/L (ref 40–129)
ALT SERPL-CCNC: 18 U/L (ref 10–40)
ALT SERPL-CCNC: 28 U/L (ref 10–40)
AMORPH SED URNS QL MICRO: ABNORMAL /HPF
ANION GAP SERPL CALCULATED.3IONS-SCNC: 8 MMOL/L (ref 3–16)
ANION GAP SERPL CALCULATED.3IONS-SCNC: 9 MMOL/L (ref 3–16)
AST SERPL-CCNC: 16 U/L (ref 15–37)
AST SERPL-CCNC: 23 U/L (ref 15–37)
BACTERIA URNS QL MICRO: ABNORMAL /HPF
BASOPHILS # BLD: 0 K/UL (ref 0–0.2)
BASOPHILS NFR BLD: 0.2 %
BILIRUB SERPL-MCNC: <0.2 MG/DL (ref 0–1)
BILIRUB SERPL-MCNC: <0.2 MG/DL (ref 0–1)
BILIRUB UR QL STRIP.AUTO: NEGATIVE
BUN SERPL-MCNC: 10 MG/DL (ref 7–20)
BUN SERPL-MCNC: 14 MG/DL (ref 7–20)
CALCIUM SERPL-MCNC: 8.4 MG/DL (ref 8.3–10.6)
CALCIUM SERPL-MCNC: 8.9 MG/DL (ref 8.3–10.6)
CHLORIDE SERPL-SCNC: 104 MMOL/L (ref 99–110)
CHLORIDE SERPL-SCNC: 105 MMOL/L (ref 99–110)
CLARITY UR: ABNORMAL
CO2 SERPL-SCNC: 23 MMOL/L (ref 21–32)
CO2 SERPL-SCNC: 28 MMOL/L (ref 21–32)
COLOR UR: YELLOW
CREAT SERPL-MCNC: 0.6 MG/DL (ref 0.6–1.1)
CREAT SERPL-MCNC: 0.7 MG/DL (ref 0.6–1.1)
DEPRECATED RDW RBC AUTO: 13.5 % (ref 12.4–15.4)
EOSINOPHIL # BLD: 0.2 K/UL (ref 0–0.6)
EOSINOPHIL NFR BLD: 2.2 %
EPI CELLS #/AREA URNS HPF: ABNORMAL /HPF (ref 0–5)
GFR SERPLBLD CREATININE-BSD FMLA CKD-EPI: >90 ML/MIN/{1.73_M2}
GFR SERPLBLD CREATININE-BSD FMLA CKD-EPI: >90 ML/MIN/{1.73_M2}
GLUCOSE SERPL-MCNC: 151 MG/DL (ref 70–99)
GLUCOSE SERPL-MCNC: 76 MG/DL (ref 70–99)
GLUCOSE UR STRIP.AUTO-MCNC: NEGATIVE MG/DL
HCG SERPL QL: NEGATIVE
HCT VFR BLD AUTO: 41.3 % (ref 36–48)
HGB BLD-MCNC: 13.9 G/DL (ref 12–16)
HGB UR QL STRIP.AUTO: NEGATIVE
KETONES UR STRIP.AUTO-MCNC: NEGATIVE MG/DL
LEUKOCYTE ESTERASE UR QL STRIP.AUTO: NEGATIVE
LIPASE SERPL-CCNC: 27 U/L (ref 13–60)
LYMPHOCYTES # BLD: 1.3 K/UL (ref 1–5.1)
LYMPHOCYTES NFR BLD: 19.6 %
MCH RBC QN AUTO: 31.7 PG (ref 26–34)
MCHC RBC AUTO-ENTMCNC: 33.7 G/DL (ref 31–36)
MCV RBC AUTO: 94.2 FL (ref 80–100)
MONOCYTES # BLD: 0.5 K/UL (ref 0–1.3)
MONOCYTES NFR BLD: 7.7 %
MUCOUS THREADS #/AREA URNS LPF: ABNORMAL /LPF
NEUTROPHILS # BLD: 4.8 K/UL (ref 1.7–7.7)
NEUTROPHILS NFR BLD: 70.3 %
NITRITE UR QL STRIP.AUTO: NEGATIVE
PH UR STRIP.AUTO: 7.5 [PH] (ref 5–8)
PLATELET # BLD AUTO: 234 K/UL (ref 135–450)
PMV BLD AUTO: 9.3 FL (ref 5–10.5)
POTASSIUM SERPL-SCNC: 4 MMOL/L (ref 3.5–5.1)
POTASSIUM SERPL-SCNC: 4.1 MMOL/L (ref 3.5–5.1)
PROCALCITONIN SERPL IA-MCNC: 0.05 NG/ML (ref 0–0.15)
PROT SERPL-MCNC: 5.9 G/DL (ref 6.4–8.2)
PROT SERPL-MCNC: 6.1 G/DL (ref 6.4–8.2)
PROT UR STRIP.AUTO-MCNC: NEGATIVE MG/DL
RBC # BLD AUTO: 4.39 M/UL (ref 4–5.2)
RBC #/AREA URNS HPF: ABNORMAL /HPF (ref 0–4)
SODIUM SERPL-SCNC: 136 MMOL/L (ref 136–145)
SODIUM SERPL-SCNC: 141 MMOL/L (ref 136–145)
SP GR UR STRIP.AUTO: 1.02 (ref 1–1.03)
UA DIPSTICK W REFLEX MICRO PNL UR: ABNORMAL
URN SPEC COLLECT METH UR: ABNORMAL
UROBILINOGEN UR STRIP-ACNC: 0.2 E.U./DL
WBC # BLD AUTO: 6.9 K/UL (ref 4–11)
WBC #/AREA URNS HPF: ABNORMAL /HPF (ref 0–5)

## 2024-11-04 PROCEDURE — 2580000003 HC RX 258: Performed by: FAMILY MEDICINE

## 2024-11-04 PROCEDURE — 84145 PROCALCITONIN (PCT): CPT

## 2024-11-04 PROCEDURE — A9579 GAD-BASE MR CONTRAST NOS,1ML: HCPCS | Performed by: STUDENT IN AN ORGANIZED HEALTH CARE EDUCATION/TRAINING PROGRAM

## 2024-11-04 PROCEDURE — 36415 COLL VENOUS BLD VENIPUNCTURE: CPT

## 2024-11-04 PROCEDURE — 96375 TX/PRO/DX INJ NEW DRUG ADDON: CPT

## 2024-11-04 PROCEDURE — 72157 MRI CHEST SPINE W/O & W/DYE: CPT

## 2024-11-04 PROCEDURE — 72156 MRI NECK SPINE W/O & W/DYE: CPT

## 2024-11-04 PROCEDURE — 6370000000 HC RX 637 (ALT 250 FOR IP): Performed by: FAMILY MEDICINE

## 2024-11-04 PROCEDURE — 6360000002 HC RX W HCPCS: Performed by: STUDENT IN AN ORGANIZED HEALTH CARE EDUCATION/TRAINING PROGRAM

## 2024-11-04 PROCEDURE — 72158 MRI LUMBAR SPINE W/O & W/DYE: CPT

## 2024-11-04 PROCEDURE — A9576 INJ PROHANCE MULTIPACK: HCPCS | Performed by: FAMILY MEDICINE

## 2024-11-04 PROCEDURE — 99285 EMERGENCY DEPT VISIT HI MDM: CPT

## 2024-11-04 PROCEDURE — 96374 THER/PROPH/DIAG INJ IV PUSH: CPT

## 2024-11-04 PROCEDURE — 6360000004 HC RX CONTRAST MEDICATION: Performed by: STUDENT IN AN ORGANIZED HEALTH CARE EDUCATION/TRAINING PROGRAM

## 2024-11-04 PROCEDURE — 81001 URINALYSIS AUTO W/SCOPE: CPT

## 2024-11-04 PROCEDURE — 80053 COMPREHEN METABOLIC PANEL: CPT

## 2024-11-04 PROCEDURE — 1200000000 HC SEMI PRIVATE

## 2024-11-04 PROCEDURE — 87040 BLOOD CULTURE FOR BACTERIA: CPT

## 2024-11-04 PROCEDURE — 74177 CT ABD & PELVIS W/CONTRAST: CPT

## 2024-11-04 PROCEDURE — 85025 COMPLETE CBC W/AUTO DIFF WBC: CPT

## 2024-11-04 PROCEDURE — 6360000004 HC RX CONTRAST MEDICATION: Performed by: FAMILY MEDICINE

## 2024-11-04 RX ORDER — SODIUM CHLORIDE 9 MG/ML
INJECTION, SOLUTION INTRAVENOUS PRN
Status: DISCONTINUED | OUTPATIENT
Start: 2024-11-04 | End: 2024-11-07 | Stop reason: HOSPADM

## 2024-11-04 RX ORDER — POTASSIUM CHLORIDE 7.45 MG/ML
10 INJECTION INTRAVENOUS PRN
Status: DISCONTINUED | OUTPATIENT
Start: 2024-11-04 | End: 2024-11-07 | Stop reason: HOSPADM

## 2024-11-04 RX ORDER — METOPROLOL SUCCINATE 25 MG/1
25 TABLET, EXTENDED RELEASE ORAL NIGHTLY
COMMUNITY
Start: 2024-05-16

## 2024-11-04 RX ORDER — SODIUM CHLORIDE 0.9 % (FLUSH) 0.9 %
5-40 SYRINGE (ML) INJECTION PRN
Status: DISCONTINUED | OUTPATIENT
Start: 2024-11-04 | End: 2024-11-07 | Stop reason: HOSPADM

## 2024-11-04 RX ORDER — FLUTICASONE PROPIONATE 50 MCG
2 SPRAY, SUSPENSION (ML) NASAL DAILY
Status: DISCONTINUED | OUTPATIENT
Start: 2024-11-04 | End: 2024-11-07 | Stop reason: HOSPADM

## 2024-11-04 RX ORDER — MAGNESIUM SULFATE IN WATER 40 MG/ML
2000 INJECTION, SOLUTION INTRAVENOUS PRN
Status: DISCONTINUED | OUTPATIENT
Start: 2024-11-04 | End: 2024-11-07 | Stop reason: HOSPADM

## 2024-11-04 RX ORDER — ASPIRIN 81 MG/1
81 TABLET, CHEWABLE ORAL DAILY
Status: DISCONTINUED | OUTPATIENT
Start: 2024-11-04 | End: 2024-11-07 | Stop reason: HOSPADM

## 2024-11-04 RX ORDER — ONDANSETRON 2 MG/ML
4 INJECTION INTRAMUSCULAR; INTRAVENOUS ONCE
Status: COMPLETED | OUTPATIENT
Start: 2024-11-04 | End: 2024-11-04

## 2024-11-04 RX ORDER — MORPHINE SULFATE 4 MG/ML
4 INJECTION, SOLUTION INTRAMUSCULAR; INTRAVENOUS ONCE
Status: COMPLETED | OUTPATIENT
Start: 2024-11-04 | End: 2024-11-04

## 2024-11-04 RX ORDER — DEXAMETHASONE SODIUM PHOSPHATE 4 MG/ML
10 INJECTION, SOLUTION INTRA-ARTICULAR; INTRALESIONAL; INTRAMUSCULAR; INTRAVENOUS; SOFT TISSUE ONCE
Qty: 2.5 ML | Refills: 0 | Status: ON HOLD | OUTPATIENT
Start: 2024-11-04 | End: 2024-11-07 | Stop reason: HOSPADM

## 2024-11-04 RX ORDER — SODIUM CHLORIDE 9 MG/ML
INJECTION, SOLUTION INTRAVENOUS ONCE
Status: DISCONTINUED | OUTPATIENT
Start: 2024-11-04 | End: 2024-11-05

## 2024-11-04 RX ORDER — ATORVASTATIN CALCIUM 80 MG/1
80 TABLET, FILM COATED ORAL NIGHTLY
Status: DISCONTINUED | OUTPATIENT
Start: 2024-11-04 | End: 2024-11-07 | Stop reason: HOSPADM

## 2024-11-04 RX ORDER — ACETAMINOPHEN 325 MG/1
650 TABLET ORAL EVERY 6 HOURS PRN
Status: DISCONTINUED | OUTPATIENT
Start: 2024-11-04 | End: 2024-11-07 | Stop reason: HOSPADM

## 2024-11-04 RX ORDER — POLYETHYLENE GLYCOL 3350 17 G/17G
17 POWDER, FOR SOLUTION ORAL DAILY PRN
Status: DISCONTINUED | OUTPATIENT
Start: 2024-11-04 | End: 2024-11-07 | Stop reason: HOSPADM

## 2024-11-04 RX ORDER — ASPIRIN 300 MG/1
300 SUPPOSITORY RECTAL DAILY
Status: DISCONTINUED | OUTPATIENT
Start: 2024-11-04 | End: 2024-11-07 | Stop reason: HOSPADM

## 2024-11-04 RX ORDER — NALTREXONE HYDROCHLORIDE 50 MG/1
50 TABLET, FILM COATED ORAL DAILY
COMMUNITY
Start: 2024-08-13

## 2024-11-04 RX ORDER — ACETAMINOPHEN 650 MG/1
650 SUPPOSITORY RECTAL EVERY 6 HOURS PRN
Status: DISCONTINUED | OUTPATIENT
Start: 2024-11-04 | End: 2024-11-07 | Stop reason: HOSPADM

## 2024-11-04 RX ORDER — HYDROMORPHONE HYDROCHLORIDE 1 MG/ML
0.5 INJECTION, SOLUTION INTRAMUSCULAR; INTRAVENOUS; SUBCUTANEOUS EVERY 4 HOURS PRN
Status: DISCONTINUED | OUTPATIENT
Start: 2024-11-04 | End: 2024-11-06

## 2024-11-04 RX ORDER — TRAZODONE HYDROCHLORIDE 50 MG/1
75 TABLET, FILM COATED ORAL NIGHTLY
Status: DISCONTINUED | OUTPATIENT
Start: 2024-11-04 | End: 2024-11-07 | Stop reason: HOSPADM

## 2024-11-04 RX ORDER — SODIUM CHLORIDE 0.9 % (FLUSH) 0.9 %
5-40 SYRINGE (ML) INJECTION EVERY 12 HOURS SCHEDULED
Status: DISCONTINUED | OUTPATIENT
Start: 2024-11-04 | End: 2024-11-07 | Stop reason: HOSPADM

## 2024-11-04 RX ORDER — ARIPIPRAZOLE 5 MG/1
5 TABLET ORAL DAILY
Status: DISCONTINUED | OUTPATIENT
Start: 2024-11-05 | End: 2024-11-07 | Stop reason: HOSPADM

## 2024-11-04 RX ORDER — BUSPIRONE HYDROCHLORIDE 10 MG/1
10 TABLET ORAL 3 TIMES DAILY
COMMUNITY
Start: 2024-09-18

## 2024-11-04 RX ORDER — ENOXAPARIN SODIUM 100 MG/ML
40 INJECTION SUBCUTANEOUS DAILY
Status: DISCONTINUED | OUTPATIENT
Start: 2024-11-04 | End: 2024-11-05

## 2024-11-04 RX ORDER — OXYCODONE HYDROCHLORIDE 5 MG/1
5 TABLET ORAL EVERY 4 HOURS PRN
Status: DISCONTINUED | OUTPATIENT
Start: 2024-11-04 | End: 2024-11-07 | Stop reason: HOSPADM

## 2024-11-04 RX ORDER — POTASSIUM CHLORIDE 1500 MG/1
40 TABLET, EXTENDED RELEASE ORAL PRN
Status: DISCONTINUED | OUTPATIENT
Start: 2024-11-04 | End: 2024-11-07 | Stop reason: HOSPADM

## 2024-11-04 RX ADMIN — OXYCODONE 5 MG: 5 TABLET ORAL at 22:26

## 2024-11-04 RX ADMIN — ATORVASTATIN CALCIUM 80 MG: 80 TABLET, FILM COATED ORAL at 22:26

## 2024-11-04 RX ADMIN — TRAZODONE HYDROCHLORIDE 75 MG: 50 TABLET ORAL at 22:26

## 2024-11-04 RX ADMIN — MORPHINE SULFATE 4 MG: 4 INJECTION, SOLUTION INTRAMUSCULAR; INTRAVENOUS at 07:17

## 2024-11-04 RX ADMIN — GADOTERIDOL 7.71 MMOL: 279.3 INJECTION, SOLUTION INTRAVENOUS at 06:37

## 2024-11-04 RX ADMIN — GADOTERIDOL 17 ML: 279.3 INJECTION, SOLUTION INTRAVENOUS at 21:51

## 2024-11-04 RX ADMIN — ONDANSETRON 4 MG: 2 INJECTION INTRAMUSCULAR; INTRAVENOUS at 10:29

## 2024-11-04 RX ADMIN — IOPAMIDOL 75 ML: 755 INJECTION, SOLUTION INTRAVENOUS at 00:27

## 2024-11-04 RX ADMIN — SODIUM CHLORIDE, PRESERVATIVE FREE 5 ML: 5 INJECTION INTRAVENOUS at 22:44

## 2024-11-04 ASSESSMENT — PAIN SCALES - GENERAL
PAINLEVEL_OUTOF10: 8

## 2024-11-04 ASSESSMENT — PAIN DESCRIPTION - DESCRIPTORS
DESCRIPTORS: PRESSURE
DESCRIPTORS: ACHING;SHOOTING

## 2024-11-04 ASSESSMENT — PAIN DESCRIPTION - LOCATION
LOCATION: BACK
LOCATION: BACK
LOCATION: BACK;LEG

## 2024-11-04 ASSESSMENT — PAIN DESCRIPTION - ORIENTATION
ORIENTATION: LEFT
ORIENTATION: LOWER

## 2024-11-04 ASSESSMENT — PAIN DESCRIPTION - PAIN TYPE: TYPE: ACUTE PAIN

## 2024-11-04 NOTE — PLAN OF CARE
[facility] Hospitalist Transfer accept note  Transfer center PS received  Case reviewed with ER physician  Reason for Transfer:    Roxanne Nicholas 34 y.o. female presented with lower back pain and LLE reduced sensation. Exam limited but patient reportedly endorsed LLE weakness and numbness and some saddle anesthesia. MRI confirmed disc herniation at L5-S1 level. Neurosurgery at [facility] contacted and agreed to consult on patient here. Admitting for further evaluation.     Patient has been accepted for transfer to [facility].   Once patient arrive please page ON CALL HOSPITALIST so patient can be seen.   If unable to reach physician on PerfectServe please call hospitalist phone (#[phone])     PCP: Timur Shea MD     Thanks  Frandy Rice MD  Hospitalist

## 2024-11-04 NOTE — ED NOTES
Dr. Rice neurosurgery called at 0740 and spoke to Dr. Isbell. Waiting on hospitalist at [facility] to call back.

## 2024-11-04 NOTE — ED NOTES
1400 AC called with bed assignment   Room# 8138  N2N# [phone] will call back with ETA   1424 AC called back spoke with Cori fish set up with [facility] 1800

## 2024-11-04 NOTE — ED NOTES
Pt updated on plan of care and transport scheduled for 1800, pt resting comfortably in bed, denies ay needs at this time

## 2024-11-05 ENCOUNTER — APPOINTMENT (OUTPATIENT)
Dept: INTERVENTIONAL RADIOLOGY/VASCULAR | Age: 34
End: 2024-11-05
Attending: STUDENT IN AN ORGANIZED HEALTH CARE EDUCATION/TRAINING PROGRAM
Payer: COMMERCIAL

## 2024-11-05 LAB
ALBUMIN SERPL-MCNC: 3.5 G/DL (ref 3.4–5)
ALBUMIN/GLOB SERPL: 1.5 {RATIO} (ref 1.1–2.2)
ALP SERPL-CCNC: 66 U/L (ref 40–129)
ALT SERPL-CCNC: 25 U/L (ref 10–40)
ANION GAP SERPL CALCULATED.3IONS-SCNC: 9 MMOL/L (ref 3–16)
AST SERPL-CCNC: 17 U/L (ref 15–37)
BASOPHILS # BLD: 0 K/UL (ref 0–0.2)
BASOPHILS NFR BLD: 0.3 %
BILIRUB SERPL-MCNC: <0.2 MG/DL (ref 0–1)
BUN SERPL-MCNC: 13 MG/DL (ref 7–20)
CALCIUM SERPL-MCNC: 8.4 MG/DL (ref 8.3–10.6)
CHLORIDE SERPL-SCNC: 106 MMOL/L (ref 99–110)
CHOLEST SERPL-MCNC: 157 MG/DL (ref 0–199)
CO2 SERPL-SCNC: 24 MMOL/L (ref 21–32)
CREAT SERPL-MCNC: 0.8 MG/DL (ref 0.6–1.1)
DEPRECATED RDW RBC AUTO: 13.2 % (ref 12.4–15.4)
ECHO BSA: 1.87 M2
EOSINOPHIL # BLD: 0.2 K/UL (ref 0–0.6)
EOSINOPHIL NFR BLD: 2.9 %
EST. AVERAGE GLUCOSE BLD GHB EST-MCNC: 93.9 MG/DL
GFR SERPLBLD CREATININE-BSD FMLA CKD-EPI: >90 ML/MIN/{1.73_M2}
GLUCOSE SERPL-MCNC: 110 MG/DL (ref 70–99)
HBA1C MFR BLD: 4.9 %
HCT VFR BLD AUTO: 38.7 % (ref 36–48)
HDLC SERPL-MCNC: 40 MG/DL (ref 40–60)
HGB BLD-MCNC: 13.3 G/DL (ref 12–16)
LDLC SERPL CALC-MCNC: 86 MG/DL
LYMPHOCYTES # BLD: 1.8 K/UL (ref 1–5.1)
LYMPHOCYTES NFR BLD: 28.3 %
MCH RBC QN AUTO: 31.9 PG (ref 26–34)
MCHC RBC AUTO-ENTMCNC: 34.3 G/DL (ref 31–36)
MCV RBC AUTO: 93 FL (ref 80–100)
MONOCYTES # BLD: 0.6 K/UL (ref 0–1.3)
MONOCYTES NFR BLD: 9.4 %
NEUTROPHILS # BLD: 3.8 K/UL (ref 1.7–7.7)
NEUTROPHILS NFR BLD: 59.1 %
PLATELET # BLD AUTO: 241 K/UL (ref 135–450)
PMV BLD AUTO: 8.8 FL (ref 5–10.5)
POTASSIUM SERPL-SCNC: 3.9 MMOL/L (ref 3.5–5.1)
PROT SERPL-MCNC: 5.9 G/DL (ref 6.4–8.2)
RBC # BLD AUTO: 4.16 M/UL (ref 4–5.2)
SODIUM SERPL-SCNC: 139 MMOL/L (ref 136–145)
TRIGL SERPL-MCNC: 156 MG/DL (ref 0–150)
VLDLC SERPL CALC-MCNC: 31 MG/DL
WBC # BLD AUTO: 6.5 K/UL (ref 4–11)

## 2024-11-05 PROCEDURE — 97530 THERAPEUTIC ACTIVITIES: CPT

## 2024-11-05 PROCEDURE — 3E0R3BZ INTRODUCTION OF ANESTHETIC AGENT INTO SPINAL CANAL, PERCUTANEOUS APPROACH: ICD-10-PCS | Performed by: RADIOLOGY

## 2024-11-05 PROCEDURE — 97166 OT EVAL MOD COMPLEX 45 MIN: CPT

## 2024-11-05 PROCEDURE — 1200000000 HC SEMI PRIVATE

## 2024-11-05 PROCEDURE — 6360000002 HC RX W HCPCS: Performed by: FAMILY MEDICINE

## 2024-11-05 PROCEDURE — 6360000002 HC RX W HCPCS: Performed by: INTERNAL MEDICINE

## 2024-11-05 PROCEDURE — 3E0R33Z INTRODUCTION OF ANTI-INFLAMMATORY INTO SPINAL CANAL, PERCUTANEOUS APPROACH: ICD-10-PCS | Performed by: RADIOLOGY

## 2024-11-05 PROCEDURE — 94640 AIRWAY INHALATION TREATMENT: CPT

## 2024-11-05 PROCEDURE — APPNB60 APP NON BILLABLE TIME 46-60 MINS: Performed by: NURSE PRACTITIONER

## 2024-11-05 PROCEDURE — 80053 COMPREHEN METABOLIC PANEL: CPT

## 2024-11-05 PROCEDURE — 97535 SELF CARE MNGMENT TRAINING: CPT

## 2024-11-05 PROCEDURE — 2580000003 HC RX 258: Performed by: INTERNAL MEDICINE

## 2024-11-05 PROCEDURE — 2709999900 HC NON-CHARGEABLE SUPPLY

## 2024-11-05 PROCEDURE — 6360000004 HC RX CONTRAST MEDICATION: Performed by: RADIOLOGY

## 2024-11-05 PROCEDURE — 6370000000 HC RX 637 (ALT 250 FOR IP): Performed by: FAMILY MEDICINE

## 2024-11-05 PROCEDURE — 6370000000 HC RX 637 (ALT 250 FOR IP): Performed by: NURSE PRACTITIONER

## 2024-11-05 PROCEDURE — 80061 LIPID PANEL: CPT

## 2024-11-05 PROCEDURE — 2580000003 HC RX 258: Performed by: FAMILY MEDICINE

## 2024-11-05 PROCEDURE — 36415 COLL VENOUS BLD VENIPUNCTURE: CPT

## 2024-11-05 PROCEDURE — B01B1ZZ FLUOROSCOPY OF SPINAL CORD USING LOW OSMOLAR CONTRAST: ICD-10-PCS | Performed by: RADIOLOGY

## 2024-11-05 PROCEDURE — 62323 NJX INTERLAMINAR LMBR/SAC: CPT

## 2024-11-05 PROCEDURE — 85025 COMPLETE CBC W/AUTO DIFF WBC: CPT

## 2024-11-05 PROCEDURE — 83036 HEMOGLOBIN GLYCOSYLATED A1C: CPT

## 2024-11-05 PROCEDURE — 97116 GAIT TRAINING THERAPY: CPT

## 2024-11-05 RX ORDER — HEPARIN SODIUM 5000 [USP'U]/ML
5000 INJECTION, SOLUTION INTRAVENOUS; SUBCUTANEOUS EVERY 8 HOURS SCHEDULED
Status: DISCONTINUED | OUTPATIENT
Start: 2024-11-06 | End: 2024-11-07 | Stop reason: HOSPADM

## 2024-11-05 RX ORDER — 0.9 % SODIUM CHLORIDE 0.9 %
500 INTRAVENOUS SOLUTION INTRAVENOUS ONCE
Status: COMPLETED | OUTPATIENT
Start: 2024-11-05 | End: 2024-11-05

## 2024-11-05 RX ORDER — ONDANSETRON 2 MG/ML
4 INJECTION INTRAMUSCULAR; INTRAVENOUS EVERY 6 HOURS PRN
Status: DISCONTINUED | OUTPATIENT
Start: 2024-11-05 | End: 2024-11-07 | Stop reason: HOSPADM

## 2024-11-05 RX ADMIN — OXYCODONE 5 MG: 5 TABLET ORAL at 21:01

## 2024-11-05 RX ADMIN — ARFORMOTEROL TARTRATE: 15 SOLUTION RESPIRATORY (INHALATION) at 21:59

## 2024-11-05 RX ADMIN — HYDROMORPHONE HYDROCHLORIDE 0.5 MG: 1 INJECTION, SOLUTION INTRAMUSCULAR; INTRAVENOUS; SUBCUTANEOUS at 08:17

## 2024-11-05 RX ADMIN — TIOTROPIUM BROMIDE INHALATION SPRAY 2 PUFF: 3.12 SPRAY, METERED RESPIRATORY (INHALATION) at 08:03

## 2024-11-05 RX ADMIN — HYDROMORPHONE HYDROCHLORIDE 0.5 MG: 1 INJECTION, SOLUTION INTRAMUSCULAR; INTRAVENOUS; SUBCUTANEOUS at 18:24

## 2024-11-05 RX ADMIN — ARFORMOTEROL TARTRATE: 15 SOLUTION RESPIRATORY (INHALATION) at 08:04

## 2024-11-05 RX ADMIN — OXYCODONE 5 MG: 5 TABLET ORAL at 15:44

## 2024-11-05 RX ADMIN — HYDROMORPHONE HYDROCHLORIDE 0.5 MG: 1 INJECTION, SOLUTION INTRAMUSCULAR; INTRAVENOUS; SUBCUTANEOUS at 22:57

## 2024-11-05 RX ADMIN — IOHEXOL 2 ML: 180 INJECTION INTRAVENOUS at 14:05

## 2024-11-05 RX ADMIN — HYDROMORPHONE HYDROCHLORIDE 0.5 MG: 1 INJECTION, SOLUTION INTRAMUSCULAR; INTRAVENOUS; SUBCUTANEOUS at 00:43

## 2024-11-05 RX ADMIN — TRAZODONE HYDROCHLORIDE 75 MG: 50 TABLET ORAL at 21:00

## 2024-11-05 RX ADMIN — SODIUM CHLORIDE, PRESERVATIVE FREE 10 ML: 5 INJECTION INTRAVENOUS at 08:18

## 2024-11-05 RX ADMIN — SODIUM CHLORIDE 500 ML: 9 INJECTION, SOLUTION INTRAVENOUS at 12:16

## 2024-11-05 RX ADMIN — ATORVASTATIN CALCIUM 80 MG: 80 TABLET, FILM COATED ORAL at 21:00

## 2024-11-05 RX ADMIN — ONDANSETRON 4 MG: 2 INJECTION INTRAMUSCULAR; INTRAVENOUS at 18:55

## 2024-11-05 RX ADMIN — OXYCODONE 5 MG: 5 TABLET ORAL at 06:48

## 2024-11-05 RX ADMIN — TIZANIDINE 2 MG: 4 TABLET ORAL at 15:44

## 2024-11-05 ASSESSMENT — PAIN DESCRIPTION - LOCATION
LOCATION: BACK
LOCATION: BACK
LOCATION: BACK;LEG
LOCATION: BACK
LOCATION: BACK
LOCATION: BACK;LEG
LOCATION: BACK;LEG
LOCATION: BACK

## 2024-11-05 ASSESSMENT — PAIN SCALES - GENERAL
PAINLEVEL_OUTOF10: 2
PAINLEVEL_OUTOF10: 10
PAINLEVEL_OUTOF10: 7
PAINLEVEL_OUTOF10: 10
PAINLEVEL_OUTOF10: 6
PAINLEVEL_OUTOF10: 9
PAINLEVEL_OUTOF10: 7
PAINLEVEL_OUTOF10: 9
PAINLEVEL_OUTOF10: 10
PAINLEVEL_OUTOF10: 4
PAINLEVEL_OUTOF10: 9
PAINLEVEL_OUTOF10: 10
PAINLEVEL_OUTOF10: 10

## 2024-11-05 ASSESSMENT — PAIN DESCRIPTION - ORIENTATION
ORIENTATION: MID;LOWER
ORIENTATION: LOWER;MID
ORIENTATION: LEFT
ORIENTATION: MID
ORIENTATION: MID
ORIENTATION: MID;RIGHT
ORIENTATION: LEFT
ORIENTATION: MID;LOWER

## 2024-11-05 ASSESSMENT — PAIN DESCRIPTION - PAIN TYPE
TYPE: ACUTE PAIN

## 2024-11-05 ASSESSMENT — PAIN - FUNCTIONAL ASSESSMENT
PAIN_FUNCTIONAL_ASSESSMENT: PREVENTS OR INTERFERES SOME ACTIVE ACTIVITIES AND ADLS

## 2024-11-05 ASSESSMENT — PAIN DESCRIPTION - DESCRIPTORS
DESCRIPTORS: ACHING
DESCRIPTORS: ACHING;DULL
DESCRIPTORS: STABBING
DESCRIPTORS: STABBING
DESCRIPTORS: HEAVINESS;DULL;ACHING
DESCRIPTORS: ACHING
DESCRIPTORS: DULL;ACHING
DESCRIPTORS: ACHING;SHARP

## 2024-11-05 ASSESSMENT — PAIN DESCRIPTION - DIRECTION
RADIATING_TOWARDS: DOWN LEFT LEG

## 2024-11-05 ASSESSMENT — PAIN DESCRIPTION - ONSET
ONSET: ON-GOING

## 2024-11-05 ASSESSMENT — PAIN DESCRIPTION - FREQUENCY
FREQUENCY: CONTINUOUS

## 2024-11-05 NOTE — PROGRESS NOTES
Physical Therapy/Occupational therapy  Hold    Orders received, chart reviewed.  Spoke with RN.  Pt with high levels of pain.  Plan is for ANTOINE this PM.  RN has been assisting pt to [facility], unable to amb to bathroom 2* pain.  Will f/u after ANTOINE for PT and OT xena.  RN in agreement.    Jennifer Patterson, PT, DPT  [id number]  Sharonda Schrader, OTR/L

## 2024-11-05 NOTE — PROGRESS NOTES
BP 81/42; checked multiple times on each arm with same results. All other vitals stable. Patient is A/Ox4. Denies feeling light headed or dizzy. No SOB or chest pain. Currently NPO for ANTOINE later this afternoon. Attending Dr. Camarillo made aware via secure message. Awaiting response.     1220-- 500 mL bolus ordered and initiated     1306-- BP up to 100/62

## 2024-11-05 NOTE — PLAN OF CARE
Problem: Skin/Tissue Integrity  Goal: Absence of new skin breakdown  Description: 1.  Monitor for areas of redness and/or skin breakdown  2.  Assess vascular access sites hourly  3.  Every 4-6 hours minimum:  Change oxygen saturation probe site  4.  Every 4-6 hours:  If on nasal continuous positive airway pressure, respiratory therapy assess nares and determine need for appliance change or resting period.  Outcome: Progressing  Note: Assisted with frequent turning and repositioning as patient allows. Mobility limited d/t pain. Heels and elbows elevated off bed surface. Zulema care completed post void. Plan of care continues.      Problem: Safety - Adult  Goal: Free from fall injury  Outcome: Progressing  Note: Fall precautions in place. Non-skid socks on. Bed locked in lowest position with alarm on and side rails up. Bedside table, belongings, and call light within reach. Patient calling out appropriately when needing assistance. Hourly rounding by RN in anticipation of patient needs. Floor clean and free from clutter. Room door open. Plan of care continues.      Problem: Pain  Goal: Verbalizes/displays adequate comfort level or baseline comfort level  Outcome: Progressing  Note: Endorsing pain to lower back that radiates down left leg. Using numerical pain rating scale appropriately. PRN medications administered per the MAR. Patient educated on medications, side effects, and verbalized understanding. Assisted with turning and repositioning in bed for comfort and pressure relief. Notified of pain medication administration schedule. Plan of care continues.

## 2024-11-05 NOTE — PROGRESS NOTES
Patient is A/Ox4. VSS, on RA. No acute neuro changes throughout shift. Lower back pain that radiates down left leg controlled with PRN medications per the MAR and ice/heat packs.   Tolerating diet well, c/o intermittent nausea.   Voiding without complications via bedside commode. Ambulating x1 stand/pivot from bed to chair; stedy for longer distances. Fall precautions in place. Bed locked in lowest position with alarm on. Call light within reach. Plan of care continues.

## 2024-11-05 NOTE — PLAN OF CARE
Brief note.  Patient seen and examined.    Patient is a 34-year-old female with history of chronic back pain who presents with worsening lower back pain with LLE numbness and weakness.  She reports intermittently feeling numb in her bilateral thighs with it being worse on the right.  She is also endorsing numbness and weakness in her left upper extremity which is not explained by the L5-S1 disc herniation.  She reports over the last week or so she will feel an intense urge to urinate that comes on suddenly but she is unable to hold it.  No changes in her bowels reported    Plan:  -Every 4 hours neurochecks  -Will obtain MRI of C-spine and T-spine as well as MRI without of the brain  -N.p.o. now, plan for epidural steroid injection tomorrow    General: Alert, no distress, well-nourished  Neurologic  Mental status:   Alert and oriented to person, place, time and situation.  Attends well to exam, fluent in conversation, no dysarthria or aphasia, follows simple commands    Cranial nerves:   CN2: Visual fields full w/o extinction on confrontational testing   CN 3,4,6: Pupils equal and reactive to light, extraocular muscles intact  CN5: Facial sensation symmetric   CN7: Face symmetric  CN8: Hearing symmetric to spoken voice  CN9: Palate elevated symmetrically  CN11: Traps full strength on shoulder shrug  CN12: Tongue midline with protrusion    Motor Exam:   R  L    Deltoid 5 5   Biceps 5 5   Triceps 5 3   Wrist extension  5 3   Interossei 5 3      R  L    Hip flexion  4 2   Hip extension  4 2   Knee flexion  4 2   Knee extension  4 2   Ankle dorsiflexion  4 2   Ankle plantar flexion  4 1       Sensory: Withdraws to pain in all 4 extremities  Cerebellar/coordination: finger nose finger normal without ataxia  Tone: normal in all 4 extremities  Gait: deferred       Roxanne Gutiérrez, ANASTACIA - CNP   Neurology & Neurocritical Care   Neurology Line: [phone]  Perfect[facility]: [facility] NPs

## 2024-11-05 NOTE — PROGRESS NOTES
bulge. Superimposed acute/subacute left paracentral/medial foraminal posterior disc herniation indenting the thecal sac and narrowing the left lateral recess. There is mild associated enhancement.  Mild facet hypertrophy.  Bilateral foraminal stenosis moderate on the right and mild/moderate on the left.     1. Acute/subacute left paracentral/medial foraminal posterior disc herniation at L5-S1 indenting the thecal sac and narrowing the left lateral recess. There is mild associated enhancement.  This may affect the descending left S1 nerve root and exiting left L5 nerve root. 2. Bilateral foraminal stenosis at L5-S1 moderate on the right and mild/moderate on the left. 3. No other significant stenosis.     CT ABDOMEN PELVIS W IV CONTRAST Additional Contrast? None    Result Date: 11/4/2024  EXAMINATION: CT OF THE ABDOMEN AND PELVIS WITH CONTRAST 11/4/2024 12:27 am TECHNIQUE: CT of the abdomen and pelvis was performed with the administration of intravenous contrast. Multiplanar reformatted images are provided for review. Automated exposure control, iterative reconstruction, and/or weight based adjustment of the mA/kV was utilized to reduce the radiation dose to as low as reasonably achievable. COMPARISON: 09/14/2020 HISTORY: ORDERING SYSTEM PROVIDED HISTORY: lower back pian into right flank TECHNOLOGIST PROVIDED HISTORY: Reason for exam:->lower back pian into right flank Additional Contrast?->None Decision Support Exception - unselect if not a suspected or confirmed emergency medical condition->Emergency Medical Condition (MA) Reason for Exam: lower back pain into right flank FINDINGS: Lower Chest: Pulmonary emphysema in the right lower lung more than the left lower lung.  Some central bronchial thickening as well.  There is no pleural effusion at the lung bases. Organs: There is no primary mass or diffuse nodularity at the liver.  Liver margin is smooth.  Previous cholecystectomy and the biliary system is within  remote study.       CBC:   Recent Labs     11/03/24 2346 11/04/24 1954 11/05/24  0614   WBC 8.2 6.9 6.5   HGB 13.5 13.9 13.3    234 241     BMP:    Recent Labs     11/03/24 2346 11/04/24 1954 11/05/24  0614    136 139   K 4.1 4.0 3.9    104 106   CO2 28 23 24   BUN 14 10 13   CREATININE 0.7 0.6 0.8   GLUCOSE 76 151* 110*     Hepatic:   Recent Labs     11/03/24 2346 11/04/24 1954 11/05/24  0614   AST 16 23 17   ALT 18 28 25   BILITOT <0.2 <0.2 <0.2   ALKPHOS 65 67 66     Lipids:   Lab Results   Component Value Date/Time    CHOL 166 05/10/2024 05:40 AM    HDL 44 05/10/2024 05:40 AM    TRIG 144 05/10/2024 05:40 AM     Hemoglobin A1C:   Lab Results   Component Value Date/Time    LABA1C 4.9 05/10/2024 05:40 AM     TSH:   Lab Results   Component Value Date/Time    TSH 2.29 05/09/2024 03:25 AM     Troponin: No results found for: \"TROPONINT\"  Lactic Acid: No results for input(s): \"LACTA\" in the last 72 hours.  BNP: No results for input(s): \"PROBNP\" in the last 72 hours.  UA:  Lab Results   Component Value Date/Time    NITRU Negative 11/04/2024 12:11 AM    COLORU Yellow 11/04/2024 12:11 AM    PHUR 7.5 11/04/2024 12:11 AM    PHUR 6.0 09/14/2020 04:09 PM    WBCUA 3-5 11/04/2024 12:11 AM    RBCUA 0-2 11/04/2024 12:11 AM    MUCUS Rare 11/04/2024 12:11 AM    BACTERIA 2+ 11/04/2024 12:11 AM    CLARITYU SL CLOUDY 11/04/2024 12:11 AM    SPECGRAV 1.025 09/09/2011 09:31 PM    LEUKOCYTESUR Negative 11/04/2024 12:11 AM    UROBILINOGEN 0.2 11/04/2024 12:11 AM    BILIRUBINUR Negative 11/04/2024 12:11 AM    BLOODU Negative 11/04/2024 12:11 AM    GLUCOSEU Negative 11/04/2024 12:11 AM    KETUA Negative 11/04/2024 12:11 AM    AMORPHOUS 1+ 11/04/2024 12:11 AM     Urine Cultures:   Lab Results   Component Value Date/Time    LABURIN  09/15/2024 07:30 PM     <10,000 CFU/ml mixed skin/urogenital eric. No further workup     Blood Cultures: No results found for: \"BC\"  No results found for: \"BLOODCULT2\"  Organism:   Lab

## 2024-11-05 NOTE — PROGRESS NOTES
Physical Therapy  Facility/Department: [facility] ORTHO/NEURO  Physical Therapy Initial Assessment / Treatment    Name: Roxanne Nicholas  : 1990  MRN: [id number]  Date of Service: 2024    Discharge Recommendations:  IP Rehab (vs home with 24hr, home PT)   PT Equipment Recommendations  Equipment Needed: No      Patient Diagnosis(es):   Past Medical History:  has a past medical history of Alpha-1-antitrypsin deficiency (HCC), Asthma, Bronchopulmonary dysplasia of , Heart murmur, History of gallstones, and PFO (patent foramen ovale).  Past Surgical History:  has a past surgical history that includes Cardiac surgery; Tonsillectomy; Adenoidectomy; Cholecystectomy (2011); and Hysterectomy (2017).    Assessment  Body Structures, Functions, Activity Limitations Requiring Skilled Therapeutic Intervention: Decreased functional mobility ;Increased pain  Assessment: Pt is 34 y.o. female admit  with back pain and L LE weakness and numbness.  Pt is from home with family ( currently incarcerated, 15 y.o. dtr, 11 y.o. son).  Pt has been receiving assist from her dtr for transfers and bed mobility as well as ADLs.  Ambulates around home by reaching for furniture.  Pt received ANTOINE earlier this date.  Demos good effort and participation despite continued pain.  Demos significant weakness L LE as well as numbness.  Back pain has limited mobility at home for several months prior to admission but dtr has been providing appropriate assist.  Pt unable to amb more than 2 ft bed to chair this date due to pain and weakness.  Would benefit from additional PT.  Rec ARU to maximize safety and independence as well as decrease caregiver burden.  If home, rec 24hr capable assist and home PT.  Discussed with MD and .  Will follow.  Treatment Diagnosis: impaired gait and transfers  Therapy Prognosis: Good  Decision Making: Medium Complexity  Requires PT Follow-Up: Yes    Plan  Physical Therapy

## 2024-11-05 NOTE — CONSULTS
NEUROSURGERY CONSULT NOTE    FAUSTINO JIMENES  [id number]   1990    Requesting physician: Frandy Rice MD    Reason for consultation: Lumbar Disc Herniation    History of present illness: Patient is a 34 y.o. female  has a past medical history of Alpha-1-antitrypsin deficiency (HCC), Asthma, Bronchopulmonary dysplasia of , Heart murmur, History of gallstones, and PFO (patent foramen ovale). Patient presented on 2024 to [facility] ED c/o chronic back pain who presents with worsening lower back pain with LLE numbness and weakness. She reports intermittently feeling numb in her bilateral thighs with it being worse on the left. She is also endorsing worsening numbness and weakness in her left upper extremity which is not explained by anything seen on MRI imaging. She reports over the last week or so she will feel an intense urge to urinate that comes on suddenly but she is unable to hold it. No changes in her bowels reported.    ROS:   GENERAL:  Denies fever or recent illness. Denies weight changes   EYES:  Denies vision change or diplopia  EARS:  Denies hearing loss  CARDIAC:  Denies chest pain  RESPIRATORY:  Denies shortness of breath  SKIN:  Denies rash or lesions   HEM:  Denies excessive bruising  PSYCH:  Denies anxiety or depression  NEURO:  Endorses left side weakness and numbness; Denies headache, numbness or tingling or lateralizing weakness elsewhere  :  Endorses urinary urgency  GI: Denies nausea, vomiting, diarrhea or constipation  MUSCULOSKELETAL:  No arthralgias    Allergies   Allergen Reactions    Bactrim [Sulfamethoxazole-Trimethoprim] Nausea Only    Penicillins Hives       Past Medical History:   Diagnosis Date    Alpha-1-antitrypsin deficiency (HCC)     Asthma     Bronchopulmonary dysplasia of      Heart murmur     History of gallstones     PFO (patent foramen ovale)         Past Surgical History:   Procedure Laterality Date    ADENOIDECTOMY      CARDIAC SURGERY

## 2024-11-05 NOTE — H&P
thickening of the vaginal cuff.  No focal fluid collection within the pelvis. Peritoneum/Retroperitoneum: No abdominal aortic aneurysm or retroperitoneal hematoma.  Small retroperitoneal, pelvic, and inguinal lymph nodes but are not enlarged by short axis. Bones/Soft Tissues: No acute fracture or destruction at the bones.  Disc space narrowing at L5-S1.     1.  Tiny calculus in the lower pole the left kidney.  However there is no hydronephrosis, hydroureter, or convincing ureteral calculus.  There are phleboliths in the pelvis. 2.  The rectum and rectosigmoid region are collapsed which may exaggerate the appearance of the wall but the wall does appear thickened.  Correlate for symptoms of distal colitis and proctitis. 3.  Previous hysterectomy.  Gas in the vaginal vault and thickening of the vaginal cuff.  This could relate to the inflammation or infection but similar to the remote study.       PCP: Timur Shea MD    Past Medical History:        Diagnosis Date    Alpha-1-antitrypsin deficiency (HCC)     Asthma     Bronchopulmonary dysplasia of      Heart murmur     History of gallstones     PFO (patent foramen ovale)        Past Surgical History:        Procedure Laterality Date    ADENOIDECTOMY      CARDIAC SURGERY      PDA coiling    CHOLECYSTECTOMY  2011    HYSTERECTOMY (CERVIX STATUS UNKNOWN)  2017    Robotic Assisted TLH & BS    TONSILLECTOMY         Medications Prior to Admission:   Prior to Admission medications    Medication Sig Start Date End Date Taking? Authorizing Provider   dexAMETHasone (DECADRON) 4 MG/ML injection Infuse 2.5 mLs intravenously once for 1 dose 24  Diego Isbell MD   ketorolac (TORADOL) 10 MG tablet Take 1 tablet by mouth every 8 hours as needed for Pain 24   Salvador Johnson MD   ARIPiprazole (ABILIFY) 5 MG tablet Take 1 tablet by mouth daily    Provider, MD Anthony   cloNIDine (CATAPRES) 0.1 MG tablet Take 0.5-1 tablets by mouth 2  times daily as needed for Other (sleep/ anxiety)    Provider, MD Anthony   traZODone (DESYREL) 50 MG tablet Take 1.5 tablets by mouth nightly    Anthony Mandel MD   fluticasone (FLONASE) 50 MCG/ACT nasal spray 2 sprays by Each Nostril route daily 10/22/23   Rosa Elena Sutherland MD   tiotropium (SPIRIVA HANDIHALER) 18 MCG inhalation capsule Inhale 1 capsule into the lungs daily 2/17/22   Anitha Lao APRN - CNP   mometasone-formoterol (DULERA) 100-5 MCG/ACT inhaler Inhale 2 puffs into the lungs 2 times daily 2/17/22   Anitha Lao APRN - CNP   albuterol (PROVENTIL) (2.5 MG/3ML) 0.083% nebulizer solution Take 3 mLs by nebulization every 4 hours as needed for Wheezing 1/29/20   Isha Oviedo PA       Labs: Personally reviewed and interpreted for clinical significance.   Recent Labs     11/03/24  2346   WBC 8.2   HGB 13.5   HCT 39.5        Recent Labs     11/03/24  2346      K 4.1      CO2 28   BUN 14   CREATININE 0.7   CALCIUM 8.9     No results for input(s): \"PROBNP\", \"TROPHS\" in the last 72 hours.  No results for input(s): \"LABA1C\" in the last 72 hours.  Recent Labs     11/03/24  2346   AST 16   ALT 18   BILITOT <0.2   ALKPHOS 65     No results for input(s): \"INR\", \"LACTA\", \"TSH\" in the last 72 hours.     Jose Manuel Hannah MD

## 2024-11-05 NOTE — CARE COORDINATION
"Request for medication refill:    Providers if patient needs an appointment and you are willing to give a one month supply please refill for one month and  send a letter/MyChart using \".SMILLIMITEDREFILL\" .smillimited and route chart to \"P SMI \" (Giving one month refill in non controlled medications is strongly recommended before denial)    If refill has been denied, meaning absolutely no refills without visit, please complete the smart phrase \".smirxrefuse\" and route it to the \"P SMI MED REFILLS\"  pool to inform the patient and the pharmacy.    Bee Kraft RN        " Case Management Assessment  Initial Evaluation    Date/Time of Evaluation: 11/5/2024 4:17 PM  Assessment Completed by: Sita Saba RN    If patient is discharged prior to next notation, then this note serves as note for discharge by case management.    Patient Name: Roxanne Nicholas                   YOB: 1990  Diagnosis: Lumbar disc herniation with radiculopathy [M51.16]                   Date / Time: 11/4/2024  6:49 PM    Patient Admission Status: Inpatient   Readmission Risk (Low < 19, Mod (19-27), High > 27): Readmission Risk Score: 11.6    Current PCP: Timur Shea MD  PCP verified by CM?      Chart Reviewed: Yes      History Provided by:    Patient Orientation:      Patient Cognition:      Hospitalization in the last 30 days (Readmission):  No    If yes, Readmission Assessment in  Navigator will be completed.    Advance Directives:      Code Status: Full Code   Patient's Primary Decision Maker is: Patient Declined (Legal Next of Kin Remains as Decision Maker)      Discharge Planning:    Patient lives with: Spouse/Significant Other, Children Type of Home: Apartment  Primary Care Giver:    Patient Support Systems include: Spouse/Significant Other, Children   Current Financial resources:    Current community resources:    Current services prior to admission: None            Current DME:              Type of Home Care services:  None    ADLS  Prior functional level:    Current functional level:      PT AM-PAC: 14 /24  OT AM-PAC:   /24    Family can provide assistance at DC:    Would you like Case Management to discuss the discharge plan with any other family members/significant others, and if so, who?    Plans to Return to Present Housing:    Other Identified Issues/Barriers to RETURNING to current housing: none  Potential Assistance needed at discharge: N/A            Potential DME:    Patient expects to discharge to: Apartment  Plan for transportation at discharge:

## 2024-11-05 NOTE — PROGRESS NOTES
(RTS with arm rests)  Bathroom Equipment: Grab bars in shower, Shower chair  Home Equipment: Rollator, Walker - Rolling, Walker - Standard, Reacher  ADL Assistance: Needs assistance (primarily sponge bathes recently; indep with sponge bathing, dtr assists with transfer in/out of shower; assist with dressing when back pain is exacerbated)  Homemaking Assistance: Needs assistance (15 y.o. dtr completes)  Ambulation Assistance: Independent (furniture walking; uses electric scooter when shopping)  Transfer Assistance: Needs assistance (x1; also has assist with bed mobility)  Active :  (gives conflicting answers, primarily has her 15 y.o. 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 \"cousin\" who has a baby could stay with pt and assist initially.  15 y.o. is homeschooled and is available to continue assisting pt at d/c    Objective  Temp: 97.6 °F (36.4 °C)  Pulse: 75  Heart Rate Source: Monitor  Respirations: 16  SpO2: 95 %  O2 Device: None (Room air)  BP: 100/62  MAP (Calculated): 75  BP Location: Left upper arm  BP Method: Automatic  Patient Position: Lying right side             Safety Devices  Type of Devices: Gait belt;Nurse notified;Call light within reach;Chair alarm in place;Left in chair  Balance  Sitting: Intact  Standing: With support (CGA at RW)  Gait  Gait Training: Yes  Overall Level of Assistance: Contact-guard assistance (pt using TTWB of LLE and heavy reliance of BUEs on RW, VCs for safe RW mgmt)  Distance (ft): 3 Feet (bed to chair)  Assistive Device: Walker, rolling;Gait belt  Speed/Gris: Pace decreased (< 100 feet/min)  Gait Abnormalities: Antalgic     AROM: Within functional limits  Strength: Generally decreased, functional (LUE)  Coordination: Generally decreased, functional (LUE)  Tone: Normal  Sensation: Impaired

## 2024-11-05 NOTE — PLAN OF CARE
Problem: Discharge Planning  Goal: Discharge to home or other facility with appropriate resources  Outcome: Progressing  Flowsheets (Taken 11/4/2024 2252)  Discharge to home or other facility with appropriate resources:   Identify barriers to discharge with patient and caregiver   Arrange for needed discharge resources and transportation as appropriate   Identify discharge learning needs (meds, wound care, etc)  Note: Patient referred for neurosurgical w/u. No discharge plan at this time.     Problem: Skin/Tissue Integrity  Goal: Absence of new skin breakdown  Description: 1.  Monitor for areas of redness and/or skin breakdown  2.  Assess vascular access sites hourly  3.  Every 4-6 hours minimum:  Change oxygen saturation probe site  4.  Every 4-6 hours:  If on nasal continuous positive airway pressure, respiratory therapy assess nares and determine need for appliance change or resting period.  Outcome: Progressing     Problem: Safety - Adult  Goal: Free from fall injury  Flowsheets (Taken 11/4/2024 2252)  Free From Fall Injury: Instruct family/caregiver on patient safety  Note: All fall / safety precautions in place. Bed locked in low position, call light within reach.

## 2024-11-06 LAB
ALBUMIN SERPL-MCNC: 3.9 G/DL (ref 3.4–5)
ALBUMIN/GLOB SERPL: 1.5 {RATIO} (ref 1.1–2.2)
ALP SERPL-CCNC: 86 U/L (ref 40–129)
ALT SERPL-CCNC: 25 U/L (ref 10–40)
ANION GAP SERPL CALCULATED.3IONS-SCNC: 8 MMOL/L (ref 3–16)
AST SERPL-CCNC: 18 U/L (ref 15–37)
BASOPHILS # BLD: 0 K/UL (ref 0–0.2)
BASOPHILS NFR BLD: 0.1 %
BILIRUB SERPL-MCNC: <0.2 MG/DL (ref 0–1)
BUN SERPL-MCNC: 12 MG/DL (ref 7–20)
CALCIUM SERPL-MCNC: 8.9 MG/DL (ref 8.3–10.6)
CHLORIDE SERPL-SCNC: 104 MMOL/L (ref 99–110)
CO2 SERPL-SCNC: 25 MMOL/L (ref 21–32)
CORTIS SERPL-MCNC: <0.8 UG/DL
CREAT SERPL-MCNC: 0.7 MG/DL (ref 0.6–1.1)
DEPRECATED RDW RBC AUTO: 13 % (ref 12.4–15.4)
EOSINOPHIL # BLD: 0 K/UL (ref 0–0.6)
EOSINOPHIL NFR BLD: 0 %
GFR SERPLBLD CREATININE-BSD FMLA CKD-EPI: >90 ML/MIN/{1.73_M2}
GLUCOSE SERPL-MCNC: 193 MG/DL (ref 70–99)
HCT VFR BLD AUTO: 40.5 % (ref 36–48)
HGB BLD-MCNC: 13.5 G/DL (ref 12–16)
LYMPHOCYTES # BLD: 0.9 K/UL (ref 1–5.1)
LYMPHOCYTES NFR BLD: 5.2 %
MCH RBC QN AUTO: 31.4 PG (ref 26–34)
MCHC RBC AUTO-ENTMCNC: 33.3 G/DL (ref 31–36)
MCV RBC AUTO: 94.4 FL (ref 80–100)
MONOCYTES # BLD: 0.8 K/UL (ref 0–1.3)
MONOCYTES NFR BLD: 4.6 %
NEUTROPHILS # BLD: 15 K/UL (ref 1.7–7.7)
NEUTROPHILS NFR BLD: 90.1 %
PLATELET # BLD AUTO: 254 K/UL (ref 135–450)
PMV BLD AUTO: 9.2 FL (ref 5–10.5)
POTASSIUM SERPL-SCNC: 4.1 MMOL/L (ref 3.5–5.1)
PROT SERPL-MCNC: 6.5 G/DL (ref 6.4–8.2)
RBC # BLD AUTO: 4.29 M/UL (ref 4–5.2)
SODIUM SERPL-SCNC: 137 MMOL/L (ref 136–145)
TSH SERPL DL<=0.005 MIU/L-ACNC: 0.47 UIU/ML (ref 0.27–4.2)
WBC # BLD AUTO: 16.6 K/UL (ref 4–11)

## 2024-11-06 PROCEDURE — 82533 TOTAL CORTISOL: CPT

## 2024-11-06 PROCEDURE — 6370000000 HC RX 637 (ALT 250 FOR IP): Performed by: NURSE PRACTITIONER

## 2024-11-06 PROCEDURE — 6370000000 HC RX 637 (ALT 250 FOR IP): Performed by: FAMILY MEDICINE

## 2024-11-06 PROCEDURE — 97116 GAIT TRAINING THERAPY: CPT

## 2024-11-06 PROCEDURE — 2580000003 HC RX 258: Performed by: FAMILY MEDICINE

## 2024-11-06 PROCEDURE — 80053 COMPREHEN METABOLIC PANEL: CPT

## 2024-11-06 PROCEDURE — APPNB45 APP NON BILLABLE 31-45 MINUTES: Performed by: NURSE PRACTITIONER

## 2024-11-06 PROCEDURE — 82024 ASSAY OF ACTH: CPT

## 2024-11-06 PROCEDURE — 6360000002 HC RX W HCPCS: Performed by: FAMILY MEDICINE

## 2024-11-06 PROCEDURE — 85025 COMPLETE CBC W/AUTO DIFF WBC: CPT

## 2024-11-06 PROCEDURE — 97530 THERAPEUTIC ACTIVITIES: CPT

## 2024-11-06 PROCEDURE — 84443 ASSAY THYROID STIM HORMONE: CPT

## 2024-11-06 PROCEDURE — 94640 AIRWAY INHALATION TREATMENT: CPT

## 2024-11-06 PROCEDURE — 1200000000 HC SEMI PRIVATE

## 2024-11-06 PROCEDURE — 36415 COLL VENOUS BLD VENIPUNCTURE: CPT

## 2024-11-06 PROCEDURE — 6360000002 HC RX W HCPCS: Performed by: INTERNAL MEDICINE

## 2024-11-06 RX ORDER — HYDROCORTISONE 10 MG/1
10 TABLET ORAL
Status: DISCONTINUED | OUTPATIENT
Start: 2024-11-07 | End: 2024-11-07 | Stop reason: HOSPADM

## 2024-11-06 RX ORDER — HYDROCORTISONE 5 MG/1
5 TABLET ORAL
Status: DISCONTINUED | OUTPATIENT
Start: 2024-11-07 | End: 2024-11-07 | Stop reason: HOSPADM

## 2024-11-06 RX ORDER — HYDROCORTISONE 20 MG/1
20 TABLET ORAL
Status: DISCONTINUED | OUTPATIENT
Start: 2024-11-07 | End: 2024-11-07 | Stop reason: HOSPADM

## 2024-11-06 RX ADMIN — SODIUM CHLORIDE, PRESERVATIVE FREE 10 ML: 5 INJECTION INTRAVENOUS at 21:10

## 2024-11-06 RX ADMIN — TIOTROPIUM BROMIDE INHALATION SPRAY 2 PUFF: 3.12 SPRAY, METERED RESPIRATORY (INHALATION) at 08:34

## 2024-11-06 RX ADMIN — HEPARIN SODIUM 5000 UNITS: 5000 INJECTION INTRAVENOUS; SUBCUTANEOUS at 21:10

## 2024-11-06 RX ADMIN — HEPARIN SODIUM 5000 UNITS: 5000 INJECTION INTRAVENOUS; SUBCUTANEOUS at 07:16

## 2024-11-06 RX ADMIN — ATORVASTATIN CALCIUM 80 MG: 80 TABLET, FILM COATED ORAL at 21:10

## 2024-11-06 RX ADMIN — ARFORMOTEROL TARTRATE: 15 SOLUTION RESPIRATORY (INHALATION) at 08:34

## 2024-11-06 RX ADMIN — ARFORMOTEROL TARTRATE: 15 SOLUTION RESPIRATORY (INHALATION) at 20:06

## 2024-11-06 RX ADMIN — POLYETHYLENE GLYCOL 3350 17 G: 17 POWDER, FOR SOLUTION ORAL at 10:03

## 2024-11-06 RX ADMIN — ASPIRIN 81 MG: 81 TABLET, CHEWABLE ORAL at 10:03

## 2024-11-06 RX ADMIN — HYDROCORTISONE SODIUM SUCCINATE 50 MG: 100 INJECTION, POWDER, FOR SOLUTION INTRAMUSCULAR; INTRAVENOUS at 14:12

## 2024-11-06 RX ADMIN — HEPARIN SODIUM 5000 UNITS: 5000 INJECTION INTRAVENOUS; SUBCUTANEOUS at 14:11

## 2024-11-06 RX ADMIN — OXYCODONE 5 MG: 5 TABLET ORAL at 14:12

## 2024-11-06 RX ADMIN — OXYCODONE 5 MG: 5 TABLET ORAL at 10:03

## 2024-11-06 RX ADMIN — ARIPIPRAZOLE 5 MG: 5 TABLET ORAL at 10:03

## 2024-11-06 RX ADMIN — SODIUM CHLORIDE, PRESERVATIVE FREE 10 ML: 5 INJECTION INTRAVENOUS at 10:04

## 2024-11-06 RX ADMIN — OXYCODONE 5 MG: 5 TABLET ORAL at 04:53

## 2024-11-06 RX ADMIN — TIZANIDINE 2 MG: 4 TABLET ORAL at 18:41

## 2024-11-06 RX ADMIN — OXYCODONE 5 MG: 5 TABLET ORAL at 18:41

## 2024-11-06 RX ADMIN — TRAZODONE HYDROCHLORIDE 75 MG: 50 TABLET ORAL at 21:10

## 2024-11-06 ASSESSMENT — PAIN DESCRIPTION - LOCATION
LOCATION: BACK

## 2024-11-06 ASSESSMENT — PAIN DESCRIPTION - FREQUENCY
FREQUENCY: CONTINUOUS

## 2024-11-06 ASSESSMENT — PAIN DESCRIPTION - PAIN TYPE
TYPE: ACUTE PAIN

## 2024-11-06 ASSESSMENT — PAIN - FUNCTIONAL ASSESSMENT
PAIN_FUNCTIONAL_ASSESSMENT: ACTIVITIES ARE NOT PREVENTED

## 2024-11-06 ASSESSMENT — PAIN DESCRIPTION - DESCRIPTORS
DESCRIPTORS: ACHING;DISCOMFORT;SHARP
DESCRIPTORS: SHARP;ACHING;DISCOMFORT
DESCRIPTORS: ACHING;DISCOMFORT;SHARP
DESCRIPTORS: ACHING;DISCOMFORT;SHARP

## 2024-11-06 ASSESSMENT — PAIN SCALES - GENERAL
PAINLEVEL_OUTOF10: 8
PAINLEVEL_OUTOF10: 6
PAINLEVEL_OUTOF10: 5
PAINLEVEL_OUTOF10: 6
PAINLEVEL_OUTOF10: 6
PAINLEVEL_OUTOF10: 7
PAINLEVEL_OUTOF10: 6
PAINLEVEL_OUTOF10: 0

## 2024-11-06 ASSESSMENT — PAIN DESCRIPTION - ORIENTATION
ORIENTATION: LEFT;LOWER
ORIENTATION: LEFT;MID
ORIENTATION: LOWER
ORIENTATION: LOWER

## 2024-11-06 ASSESSMENT — PAIN DESCRIPTION - ONSET
ONSET: ON-GOING

## 2024-11-06 NOTE — PROGRESS NOTES
NEUROSURGERY     FAUSTINO BOSS [facility]   [id number]   1990 11/6/2024    Interval History:  Hospital Day #2        Subjective: patient is lying in bed, report no improvement since ANTOINE injection. No acute changes overnight    Objective:  BP (!) 112/54   Pulse (!) 108   Temp 97.8 °F (36.6 °C) (Oral)   Resp 16   Ht 1.626 m (5' 4.02\")   Wt 76.2 kg (168 lb)   LMP 07/12/2014   SpO2 94%   BMI 28.82 kg/m²     Labs:  Recent Labs     11/04/24 1954 11/05/24 0614 11/06/24  0806    139 137    106 104   CO2 23 24 25   BUN 10 13 12   CREATININE 0.6 0.8 0.7   GLUCOSE 151* 110* 193*     Recent Labs     11/04/24 1954 11/05/24 0614 11/06/24  0807   WBC 6.9 6.5 16.6*   RBC 4.39 4.16 4.29       Neurologic Exam:  GCS:  4 - Opens eyes on own  5 - Alert and oriented  6 - Follows simple motor commands    Mental Status: Awake, alert, oriented x 4, speech clear and appropriate  Language: No aphasia or dysarthria noted  Sensation: Intact to all extremities to light touch on right side of body, she has decrease sensation to touch on left side of body  Coordination: Intact    DTRs:   There is no ankle clonus      Musculoskeletal:   Gait: Not tested   Tone:   Motor strength:    Right  Left    Right  Left    Deltoid  5 4  Hip Flex  4 3   Biceps  5 4  Knee Extensors  4 3   Triceps  5 3  Knee Flexors  4 3   Wrist Ext  5 3  Ankle Dorsiflex.  4 3   Wrist Flex  5 3  Ankle Plantarflex.  4 3   Handgrip  5 3  Ext Mahendra Longus  4 3   Thumb Ext  5 3               Radiological Findings:  MRI BRAIN W WO CONTRAST  Result Date: 11/4/2024  No significant intracranial abnormality identified within limitations related to extensive motion artifact and artifact from dental fixation. Similar findings present on previous MRI.     MRI CERVICAL SPINE W WO CONTRAST  Result Date: 11/4/2024  Reversal the normal lordotic curvature. Extensive motion artifact limits overall exam. The sequences were repeated multiple times with little benefit in

## 2024-11-06 NOTE — PROGRESS NOTES
Pt. A&O x4, VSS, RA, accept for soft BP (pt. Baseline). Pt. Ambulates GB/walker, tolerates fairly well. Pain managed via MAR. Voiding adequately via PW/bedside commode. Tolerating PO. All safety precautions in place, call light/items in reach.

## 2024-11-06 NOTE — PLAN OF CARE
Problem: Safety - Adult  Goal: Free from fall injury  Outcome: Progressing  Note: Pt will remain free of falls for the duration of the shift. Pt is A/O x4  and uses the call light appropriately for needs.  Pt is assist x1 stand and pivot. Bed alarm on, wheels locked, bed in lowest position, side rails up 2/4, nonskid socks on, call light and bedside table in reach.      Problem: Pain  Goal: Verbalizes/displays adequate comfort level or baseline comfort level  Outcome: Progressing  Note: Pt taking PO and IV pain medication for back pain

## 2024-11-06 NOTE — CONSULTS
rashes or breakdown noted.   Ext: No significant edema appreciated. No varicosities.  MSK: Left extremities weakness, worse in the leg  Neuro: Alert, oriented, appropriate. No cranial nerve deficits appreciated. Sensation intact to light touch. Motor examination reveals normal strength in all four limbs diffusely. No abnormalities with finger/nose or heel/shin noted. Reflexes normal and symmetric.  Psych: Stable mood, normal judgement, normal affect     Lab Results   Component Value Date    WBC 16.6 (H) 11/06/2024    HGB 13.5 11/06/2024    HCT 40.5 11/06/2024    MCV 94.4 11/06/2024     11/06/2024     Lab Results   Component Value Date    INR 0.90 06/29/2024    INR 0.97 05/22/2023    PROTIME 12.3 06/29/2024    PROTIME 12.9 05/22/2023     Lab Results   Component Value Date    CREATININE 0.7 11/06/2024    BUN 12 11/06/2024     11/06/2024    K 4.1 11/06/2024     11/06/2024    CO2 25 11/06/2024     Lab Results   Component Value Date    ALT 25 11/06/2024    AST 18 11/06/2024    ALKPHOS 86 11/06/2024    BILITOT <0.2 11/06/2024       Most recent echocardiogram revealed: 05/9/24: Normal left ventricular systolic function with a visually estimated EF of 60 - 65%. Left ventricle size is normal. Mildly increased wall thickness. Normal wall motion. Grade I diastolic dysfunction with normal LAP.       Most recent EKG revealed: 9/16/24: Wandering pacemaker, otherwise normal       Most recent imaging studies revealed:    IR INJ INTERLAMINAR EPI/SUBARACHNOID LUMB/SAC   Final Result      MRI BRAIN W WO CONTRAST   Final Result      1. No significant intracranial abnormality identified within limitations related to extensive motion artifact and artifact from dental fixation. Similar findings present on previous MRI.                Electronically signed by Alexander Mitchell MD      MRI THORACIC SPINE W WO CONTRAST   Final Result   1. Minimal degenerative disc disease. No significant canal or foraminal narrowing. No cord  signal abnormality or suspicious enhancement.      Electronically signed by Alexander Mitchell MD      MRI CERVICAL SPINE W WO CONTRAST   Final Result   1. Degenerative disc disease with tiny disc herniations as described. No evidence of significant central canal stenosis or significant foraminal narrowing.      Electronically signed by Alexander Mitchell MD            Assessment:  This is a 34-year-old female past medical history of chronic back pain with left lower extremity weakness who was admitted for worsening weakness on the left side.  No surgical intervention by neurosurgery.  Patient has worsening baseline function as she is not able to ambulate without a walker.  She has left lower extremity weakness with strength of 1/5 on physical examination associated with tingling sensation.     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  MRI of the cervical and lumbar spine consistent with disc protrusion.  Neurosurgery recommended no surgical intervention the patient received epidural injection.  Patient currently      Defer to another facility    Thank you for this consult. Please contact me with any questions or concerns.     Prince Allan Webster MD  [id number]    Discussed with Joel Wolf D.O. M.P.H      This patient has been seen, examined, and discussed with the resident. I was part of the key critical services provided to the patient. I agree with the residents documentation. This note may have been altered to reflect my own examination findings, impression, and recommendations.     Joel Wolf D.O. M.P.H  PM&R  11/6/2024  2:48 PM

## 2024-11-06 NOTE — PROGRESS NOTES
No acute events over night. VSS. Pt is A/Ox 4. Pt taking PO and IV pain medication for pain. Bed alarm on, wheels locked, bed in lowest position, side rails up 2/4, nonskid socks on, call light and bedside table in reach.

## 2024-11-06 NOTE — PROGRESS NOTES
Physical Therapy  Facility/Department: [facility] ORTHO/NEURO  Physical Therapy Treatment    Name: Roxanne Nicholas  : 1990  MRN: [id number]  Date of Service: 2024    Discharge Recommendations:  IP Rehab   PT Equipment Recommendations  Equipment Needed: No      Patient Diagnosis(es):   Past Medical History:  has a past medical history of Alpha-1-antitrypsin deficiency (HCC), Asthma, Bronchopulmonary dysplasia of , Heart murmur, History of gallstones, and PFO (patent foramen ovale).  Past Surgical History:  has a past surgical history that includes Cardiac surgery; Tonsillectomy; Adenoidectomy; Cholecystectomy (2011); and Hysterectomy (2017).    Assessment  Assessment: Mobility continues to remain limited due to pain and weakness.  Pt requires increased effort for all tasks.  Demos good effort and participation despite pain.  Unable to amb more than a few feet bed to chair.  Rec ARU.  If home, will need 24hr capable assist and home PT.  Will follow.  Treatment Diagnosis: impaired gait and transfers  Therapy Prognosis: Good  Requires PT Follow-Up: Yes    Plan  Physical Therapy Plan  General Plan:  (2-5)  Current Treatment Recommendations: Strengthening, Balance training, Functional mobility training, Gait training, Transfer training, Patient/Caregiver education & training, Therapeutic activities, Equipment evaluation, education, & procurement  Safety Devices  Type of Devices: Gait belt, Nurse notified, Call light within reach, Chair alarm in place, Left in chair (LEs elevated, lumbar support in chair)    Restrictions  Position Activity Restriction  Other position/activity restrictions: up with assist     Subjective  General  Chart Reviewed: Yes  Additional Pertinent Hx: 34 y.o. female admit  with L UE and LE weakness and numbness, fall; L-spine MRI: (+) Acute/subacute left paracentral/medial foraminal posterior disc herniation  at L5-S1; brain MRI: (-) acute; C/T spine MRI: DDD;   Commands: Appears intact  Attention Span: Attends with cues to redirect  Memory: Appears intact  Safety Judgement: Appears intact  Cognition Comment: tangential conversation    Objective                            Bed mobility  Supine to Sit: Contact guard assistance (HOB elevated, slow and effortful, using UEs to advance LEs)  Scooting: Contact guard assistance  Transfers  Sit to Stand: Minimal Assistance  Stand to Sit: Minimal Assistance  Ambulation  Device: Rolling Walker  Assistance: Minimal assistance  Quality of Gait: relies heavily on UE support through walker, demos minimal WB through L LE due to pain, slow and effortful, unsteady but no overt LOB  Distance: 2 ft bed to chair  Comments: distance limited due to pain and L LE weakness     Balance  Sitting - Static: Good  Sitting - Dynamic: Good  Standing - Static: Fair  Standing - Dynamic: Fair          OutComes Score                                                  AM-PAC - Mobility    AM-PAC Basic Mobility - Inpatient   How much help is needed turning from your back to your side while in a flat bed without using bedrails?: A Little  How much help is needed moving from lying on your back to sitting on the side of a flat bed without using bedrails?: A Little  How much help is needed moving to and from a bed to a chair?: A Little  How much help is needed standing up from a chair using your arms?: A Little  How much help is needed walking in hospital room?: Total  How much help is needed climbing 3-5 steps with a railing?: Total  AM-PAC Inpatient Mobility Raw Score : 14  AM-PAC Inpatient T-Scale Score : 38.1  Mobility Inpatient CMS 0-100% Score: 61.29  Mobility Inpatient CMS G-Code Modifier : CL         Tinneti Score       Goals  Short Term Goals  Time Frame for Short Term Goals: discharge  Short Term Goal 1: Pt will transfer supine <--> sit with supervision  ongoing  Short Term Goal 2: Pt will transfer sit <--> stand with supervision  ongoing  Short Term Goal

## 2024-11-06 NOTE — PLAN OF CARE
Problem: Discharge Planning  Goal: Discharge to home or other facility with appropriate resources  Outcome: Progressing  Patient actively participates in ADL's and decision making regarding plan of care.     Problem: Skin/Tissue Integrity  Goal: Absence of new skin breakdown  Description: 1.  Monitor for areas of redness and/or skin breakdown  2.  Assess vascular access sites hourly  3.  Every 4-6 hours minimum:  Change oxygen saturation probe site  4.  Every 4-6 hours:  If on nasal continuous positive airway pressure, respiratory therapy assess nares and determine need for appliance change or resting period.  Outcome: Progressing  No new skin breakdown noted, no new signs/symptoms of infection, continue to monitor labwork including WBC, medications administered per physician orders.     Problem: Safety - Adult  Goal: Free from fall injury  11/6/2024 1227 by Amy Lundy RN  Outcome: Progressing  11/6/2024 0826 by Bonnie Russ RN  Outcome: Progressing  Note: Pt will remain free of falls for the duration of the shift. Pt is A/O x4  and uses the call light appropriately for needs.  Pt is assist x1 stand and pivot. Bed alarm on, wheels locked, bed in lowest position, side rails up 2/4, nonskid socks on, call light and bedside table in reach.      Problem: Pain  Goal: Verbalizes/displays adequate comfort level or baseline comfort level  11/6/2024 1227 by Amy Lundy RN  Outcome: Progressing  11/6/2024 0826 by Bonnie Russ RN  Outcome: Progressing  Note: Pt taking PO and IV pain medication for back pain      Problem: ABCDS Injury Assessment  Goal: Absence of physical injury  Outcome: Progressing

## 2024-11-06 NOTE — PROGRESS NOTES
was a concern for this may affect the descending left S1 root and exiting left L5 nerve root.  For mild bilateral stenosis L5-S1 moderate on the right and mild to moderate on the left for also noted.    Admitted for further management  Seen by neurosurgery and recommended epidural steroid injection    Problem list  Lumbar disc herniation with left lower extremity radiculopathy  On neurological exam 3 out of 5 strength in the left lower extremity and 3 out of 5 strength in the left triceps wrist extensors/flexors hand  and thumb extensors.  5-5 strength in the right upper and lower extremities.  Catalan test positive, inconsistent weakness in the left lower extremity  This time no neurosurgical intervention is recommended  Underwent epidural steroid injection  Continued PT OT  Patient is interested in going to inpatient rehab, case management working on this  Overall neurological exam is stable  Continue neurochecks every 4 hours  Stop IV hydromorphone today    Adrenal insufficiency, cortisol checked around 8 AM was less than 0.8  Will add ACTH  Give a dose of Solu-Cortef IV  Start hydrocortisone oral 20 in the morning 10 with lunch and 5 with dinner  Will need outpatient follow-up with endocrinology    Colon thickening on imaging, she denies any symptoms.  Procalcitonin on admission 0.05 blood culture set was done admission no growth to date    Left lower pole kidney calculi, no hydronephrosis hydroureter, recommend low-sodium diet  History of hysterectomy, denies any discharge or any complains        Diet ADULT DIET; Regular  ORAL HYDRATION; Electrolyte Solution; 600 ml (20 oz)   DVT Prophylaxis [] Lovenox, [x]  Heparin, [] SCDs, [] Ambulation,  [] Eliquis, [] Xarelto  [] Coumadin   Code Status Full Code   Disposition From: Home  Expected Disposition: Home health care versus ARU  Estimated Date of Discharge: 11/6/2024  Patient requires continued admission due to close monitoring of PT and OT post ANTOINE   Surrogate  ORDERING SYSTEM PROVIDED HISTORY: lower back pian into right flank TECHNOLOGIST PROVIDED HISTORY: Reason for exam:->lower back pian into right flank Additional Contrast?->None Decision Support Exception - unselect if not a suspected or confirmed emergency medical condition->Emergency Medical Condition (MA) Reason for Exam: lower back pain into right flank FINDINGS: Lower Chest: Pulmonary emphysema in the right lower lung more than the left lower lung.  Some central bronchial thickening as well.  There is no pleural effusion at the lung bases. Organs: There is no primary mass or diffuse nodularity at the liver.  Liver margin is smooth.  Previous cholecystectomy and the biliary system is within expectations for the post cholecystectomy status.  The spleen is not enlarged.  No pancreatic calcification, ductal dilatation, or surrounding fluid collection.  The adrenal glands are unremarkable.  Kidneys are enhancing equally without acute cortical defect, perinephric stranding, or hydronephrosis.  Tiny calculus is noted in the lower pole of the left kidney. The ureters are not dilated.  There are phleboliths in the pelvis but without evidence of a ureteral calculus. GI/Bowel: The stomach, small bowel, and colon are not dilated.  There is constipation of the ascending and transverse colon with decreasing volume at the descending colon.  The sigmoid and rectum are mostly empty but with a borderline thickening of the wall.  There is no focal pericolonic fat stranding to indicate diverticulitis.  The appendix is well visualized and no appendicitis.  There is no ascites or pneumoperitoneum. Pelvis: The urinary bladder is under distended but without wall thickening. Previous hysterectomy is noted.  Gas in the vaginal vault and does appear to have thickening of the vaginal cuff.  No focal fluid collection within the pelvis. Peritoneum/Retroperitoneum: No abdominal aortic aneurysm or retroperitoneal hematoma.  Small

## 2024-11-06 NOTE — CARE COORDINATION
CM spoke with patient at bedside.  She is interested in SNF at [facility] as a second choice, wanting to go to acute rehab.  KRISTI spoke with Ruiz at [facility] who has accepted and will start pre-cert.  KRISTI faxed referral to [place] to check for bed availability and if pt's insurance is in network.      Sita Saba, RN, BSN,    Ortho/Neuro   [phone]

## 2024-11-06 NOTE — PROGRESS NOTES
[facility] - Acute Rehab Unit   After review, this patient is felt to be:       [x]  Dr. Wolf states this patient is appropriate for rehab.     []  Not appropriate for Acute Inpatient Rehab    []  Referral received and ARU reviewing patient      Defer to another facility at this time. Patient interested in SNF.   Will notify CM/SW with further updates. Thank you for the referral.    Eileen LY, OTR/L  Clinical Liaison- [facility]   (P): [phone]  (F): [phone]

## 2024-11-07 VITALS
TEMPERATURE: 97.9 F | OXYGEN SATURATION: 96 % | WEIGHT: 176.37 LBS | RESPIRATION RATE: 18 BRPM | DIASTOLIC BLOOD PRESSURE: 71 MMHG | HEIGHT: 64 IN | BODY MASS INDEX: 30.11 KG/M2 | HEART RATE: 86 BPM | SYSTOLIC BLOOD PRESSURE: 117 MMHG

## 2024-11-07 PROBLEM — E27.40 ADRENAL INSUFFICIENCY (HCC): Status: ACTIVE | Noted: 2024-11-07

## 2024-11-07 PROCEDURE — 6370000000 HC RX 637 (ALT 250 FOR IP): Performed by: NURSE PRACTITIONER

## 2024-11-07 PROCEDURE — 6360000002 HC RX W HCPCS: Performed by: INTERNAL MEDICINE

## 2024-11-07 PROCEDURE — 6370000000 HC RX 637 (ALT 250 FOR IP): Performed by: FAMILY MEDICINE

## 2024-11-07 RX ORDER — HYDROCORTISONE 5 MG/1
TABLET ORAL
Qty: 180 TABLET | Refills: 0 | Status: SHIPPED | OUTPATIENT
Start: 2024-11-07 | End: 2024-12-07

## 2024-11-07 RX ORDER — OXYCODONE HYDROCHLORIDE 5 MG/1
5 TABLET ORAL EVERY 8 HOURS PRN
Qty: 9 TABLET | Refills: 0 | Status: SHIPPED | OUTPATIENT
Start: 2024-11-07 | End: 2024-11-10

## 2024-11-07 RX ADMIN — ASPIRIN 81 MG: 81 TABLET, CHEWABLE ORAL at 08:40

## 2024-11-07 RX ADMIN — HEPARIN SODIUM 5000 UNITS: 5000 INJECTION INTRAVENOUS; SUBCUTANEOUS at 06:03

## 2024-11-07 RX ADMIN — OXYCODONE 5 MG: 5 TABLET ORAL at 00:48

## 2024-11-07 RX ADMIN — TIZANIDINE 2 MG: 4 TABLET ORAL at 00:49

## 2024-11-07 RX ADMIN — OXYCODONE 5 MG: 5 TABLET ORAL at 10:21

## 2024-11-07 ASSESSMENT — PAIN DESCRIPTION - LOCATION: LOCATION: BACK

## 2024-11-07 ASSESSMENT — PAIN SCALES - GENERAL
PAINLEVEL_OUTOF10: 0
PAINLEVEL_OUTOF10: 10

## 2024-11-07 NOTE — PLAN OF CARE
Problem: Discharge Planning  Goal: Discharge to home or other facility with appropriate resources  11/7/2024 1013 by Elizabeth Fair RN  Outcome: Progressing  11/7/2024 0503 by Juanita France RN  Outcome: Progressing     Problem: Skin/Tissue Integrity  Goal: Absence of new skin breakdown  Description: 1.  Monitor for areas of redness and/or skin breakdown  2.  Assess vascular access sites hourly  3.  Every 4-6 hours minimum:  Change oxygen saturation probe site  4.  Every 4-6 hours:  If on nasal continuous positive airway pressure, respiratory therapy assess nares and determine need for appliance change or resting period.  11/7/2024 1013 by Elizabeth Fair RN  Outcome: Progressing  11/7/2024 0503 by Juanita France RN  Outcome: Progressing     Problem: Safety - Adult  Goal: Free from fall injury  11/7/2024 0503 by Juanita France RN  Outcome: Progressing     Problem: Pain  Goal: Verbalizes/displays adequate comfort level or baseline comfort level  11/7/2024 0503 by Juanita France RN  Outcome: Progressing     Problem: ABCDS Injury Assessment  Goal: Absence of physical injury  11/7/2024 0503 by Juanita France RN  Outcome: Progressing

## 2024-11-07 NOTE — PROGRESS NOTES
Patient transferred to room 3309 from .  Patient A&Ox4 upon arrival. VSS. Patient oriented to room, staff, and call system. Educated on fall protocol and hourly rounding. Bed locked in low position. Patient informed to utilize call light with any needs. Pt verbalized understanding. Call light within reach. Will continue to monitor.

## 2024-11-07 NOTE — PROGRESS NOTES
4 Eyes Skin Assessment     NAME:  Roxanne Nicholas  YOB: 1990  MEDICAL RECORD NUMBER:  [id number]    The patient is being assessed for  Transfer to New Unit    I agree that at least one RN has performed a thorough Head to Toe Skin Assessment on the patient. ALL assessment sites listed below have been assessed.      Areas assessed by both nurses:    Head, Face, Ears, Shoulders, Back, Chest, Arms, Elbows, Hands, Sacrum. Buttock, Coccyx, Ischium, Legs. Feet and Heels, and Under Medical Devices         Does the Patient have a Wound? No noted wound(s)       Sacha Prevention initiated by RN: No  Wound Care Orders initiated by RN: No    Pressure Injury (Stage 3,4, Unstageable, DTI, NWPT, and Complex wounds) if present, place Wound referral order by RN under : No    New Ostomies, if present place, Ostomy referral order under : No     Nurse 1 eSignature: Electronically signed by Juanita France RN on 11/7/24 at 4:49 AM EST    **SHARE this note so that the co-signing nurse can place an eSignature**    Nurse 2 eSignature: Electronically signed by Emily Patel RN on 11/7/24 at 2:27 AM EST

## 2024-11-07 NOTE — DISCHARGE SUMMARY
V2.0  Discharge Summary    Name:  Roxanne Nicholas /Age/Sex: 1990 (34 y.o. female)   Admit Date: 2024  Discharge Date: 24    MRN & CSN:  [id number] & [id number] Encounter Date and Time 24 9:40 AM EST    Attending:  Samuel Camarillo MD Discharging Provider: Samuel Camarillo MD       Hospital Course:     Roxanne Nicholas is a 34 y.o. female with pmh of   Bronchopulmonary dysplasia, alpha-1 antitrypsin deficiency, asthma, gallstone, patent thurman ovale, Gucci-Danlos  Since the age of 8 she has had left upper and lower extremity weakness and numbness  She followed  neurology but now says that she follows Dr. Panda, last seen in 2024 when patient was at [facility], at that time neurologically and imaging did not show abnormality that would correspond with her symptoms.  She is asked to follow a B12/folate/methylmalonic acid (normal, maintain hydration and follow with PT OT.      presents to [facility] emergency room with chronic back pain presents with worsening lower back pain with left cortical numbness weakness  She reported intermittently feeling numb in her bilateral thighs  She tells me that she always has urgency to pee when she needs to go she needs to rush to the restroom to pee.  For the last 1 month she tells me that she is also had trouble with fecal incontinence, says she sat down to pee couple of weeks back and then mixing he knows she is passing stool.  No recent changes in medications.     MRI brain did not show acute intracranial normalities.  MRI cervical spine showed motion artifact no cord signal abnormality no marrow replacing lesion.  No significant disc herniation.  MRI thoracic spine no suspicions enhancing lesions.  Mild disc bulge T8 11 to T12 and T12-L1.  No canal stenosis noted.  Lumbar spine showed acute/subacute left paracentral and medial 4 middle posterior disc herniation at L5-S1 indenting the thecal sac and narrowing the left lateral recess.

## 2024-11-07 NOTE — DISCHARGE INSTR - COC
Continuity of Care Form    Patient Name: Roxanne Nicholas   :  1990  MRN:  [id number]    Admit date:  2024  Discharge date:  ***    Code Status Order: Full Code   Advance Directives:   Advance Care Flowsheet Documentation             Admitting Physician:  Frandy Rice MD  PCP: Timur Shea MD    Discharging Nurse: ***  Discharging Hospital Unit/Room#: 3309/3309-01  Discharging Unit Phone Number: ***    Emergency Contact:   Extended Emergency Contact Information  Primary Emergency Contact: [place] [address] [facility]  Home Phone: [phone]  Relation: Spouse    Past Surgical History:  Past Surgical History:   Procedure Laterality Date    ADENOIDECTOMY      CARDIAC SURGERY      PDA coiling    CHOLECYSTECTOMY  2011    HYSTERECTOMY (CERVIX STATUS UNKNOWN)  2017    Robotic Assisted TLH & BS    TONSILLECTOMY         Immunization History:   Immunization History   Administered Date(s) Administered    COVID-19, PFIZER PURPLE top, DILUTE for use, (age 12 y+), 30mcg/0.3mL 10/21/2021       Active Problems:  Patient Active Problem List   Diagnosis Code    H/O repair of patent ductus arteriosus Z87.74    Dizziness R42    Shortness of breath R06.02    Bipolar disorder ([facility]) F31.9    Uncomplicated asthma J45.909    Polyneuropathy G62.9    Postural hypotension I95.1    Orthostatic hypotension I95.1    Lumbar disc herniation with radiculopathy M51.16    Adrenal insufficiency ([facility]) E27.40       Isolation/Infection:   Isolation            No Isolation          Patient Infection Status       Infection Onset Added Last Indicated Last Indicated By Review Planned Expiration Resolved Resolved By    None active    Resolved    COVID-19 24 COVID-19, Rapid  history 24 Infection                        Nurse Assessment:  Last Vital Signs: /71   Pulse 86   Temp 97.9 °F (36.6 °C) (Oral)   Resp 18   Ht 1.626 m (5' 4.02\")   Wt 80

## 2024-11-07 NOTE — ED PROVIDER NOTES
Emergency Department Encounter    Patient: Roxanne Nicholas  MRN: [id number]  : 1990  Date of Evaluation: 2024  ED Provider:  Diego Isbell MD    Triage Chief Complaint:   Back Pain (Has degenerative disc disease, meds at home aren't working )    [place]:  Roxanne Nicholas is a 34 y.o. female with history of chronic lower back pain presenting with increased lower back pain over the past several days.  Patient states she does have chronic lower back pain as well as chronic mild weakness and numbness in the left upper and lower extremity and has been told is secondary to polyneuropathy.  Patient states for the past 2 days she has had significantly increased lower back pain with increased weakness and numbness in the left lower extremity as well as urinary continence.  Denies any pain along the C or T-spine.  Denies significant change in motor or sensory function of the left upper extremity.  Denies fevers or chills.  States she does have a drug abuse history but has not used recently.  States she does have some pain into the bilateral flanks as well that she believes is coming from her back.  Denies chest pain or shortness of breath lightness or dizziness.  Denies headache, blurred vision, focal deficits, motor or sensory changes.  Patient states her left leg numbness and weakness has gotten so bad that she did fall.  She states she did not hit her head and denies any significant injury to the fall.  But states she has gotten to the point where she is having to move her leg with her arms and is having have significant help at home getting around    ROS - see HPI, below listed is current ROS at time of my eval:  At least 14 systems reviewed, negative other than HPI    Past Medical History:   Diagnosis Date    Alpha-1-antitrypsin deficiency (HCC)     Asthma     Bronchopulmonary dysplasia of      Heart murmur     History of gallstones     PFO (patent foramen ovale)      Past Surgical History:

## 2024-11-07 NOTE — PROGRESS NOTES
Educated pt on discharge instructions. Following up with pcp in 2 weeks.  Patient obtained meds to bed and will be taking lyft home.      All questions answered. Pain controlled. Vitals stable.  Patient showered and is ready to leave.

## 2024-11-07 NOTE — PROGRESS NOTES
Progress Note  Physical Medicine and Rehabilitation    Patient: Roxanne Nicholas  [id number]  Date: 11/7/2024      Chief Complaint: Low back pain    Interval history  Patient was seen and examined at bedside, she was dressed up ready for discharge this morning.  She had no complaints  Vitals were stable    History of Present Illness/Hospital Course:  This is a 35-year-old female with history of anxiety disorder, asthma, PFO, Gucci-Danlos who initially presented to the ER with lower back pain.  Per patient lower back pain has been chronic, but got worse over the past week.  She reported weakness in both upper and lower extremities which almost resulted in a fall in the shower.  In addition she reports numbness in the left lower left lower extremity and had 1 episode of bladder incontinence..  She denies any fevers, chills or abdominal pain.  CT abdomen and pelvis showed tiny left kidney calculus in the lower pole, and the previous history of hysterectomy.  MRI of the lumbar and cervical spine showed acute/subacute paracentral medial foraminal posterior disc herniation at the level of L5-S1.   Neurosurgery was consulted who recommended no surgical intervention at this time and patient received epidural injection of Kenalog on 11/5/2024.  Patient has been evaluated by physical and occupational therapy with AM-PAC mobility score of 14 and 15 respectively.    Physical medicine and rehabilitation consulted to assist patient optimize functionality before discharge    Prior Level of Function:  Needs assistance in most ADLs from spouse including bathing, dressing    Current Level of Function:  Decline from baseline,  requires assistance to ambulate.    Pertinent Social History:  Support: Patient lives with spouse and 2 children 15-year-old 11-year-old.  Requires  support most home ADLs  Home set-up: Living at home with family     Past Medical History:   Diagnosis Date    Alpha-1-antitrypsin deficiency (HCC)      Similar findings present on previous MRI.                Electronically signed by Alexander Mitchell MD      MRI THORACIC SPINE W WO CONTRAST   Final Result   1. Minimal degenerative disc disease. No significant canal or foraminal narrowing. No cord signal abnormality or suspicious enhancement.      Electronically signed by Alexander Mitchell MD      MRI CERVICAL SPINE W WO CONTRAST   Final Result   1. Degenerative disc disease with tiny disc herniations as described. No evidence of significant central canal stenosis or significant foraminal narrowing.      Electronically signed by Alexander Mitchell MD            Assessment:  This is a 34-year-old female past medical history of chronic back pain with left lower extremity weakness who was admitted for worsening weakness on the left side.  No surgical intervention by neurosurgery.  Patient has worsening baseline function as she is not able to ambulate without a walker.  She has left lower extremity weakness with strength of 1/5 on physical examination associated with tingling sensation.     Impairments- Decreased functional mobility, Decreased ADLs    Recommendations:  Patient getting discharged today. Reconsult PM&R when approprirate    Thank you for this consult. Please contact me with any questions or concerns.     Prince Allan Webster MD  [id number]    Discussed with Joel Wolf D.O. M.P.H      This patient has been seen, examined, and discussed with the resident. I was part of the key critical services provided to the patient. I agree with the residents documentation. This note may have been altered to reflect my own examination findings, impression, and recommendations.     Joel Wolf D.O. M.P.H  PM&R  11/7/2024  11:36 AM

## 2024-11-07 NOTE — PLAN OF CARE
Problem: Discharge Planning  Goal: Discharge to home or other facility with appropriate resources  Outcome: Progressing     Problem: Skin/Tissue Integrity  Goal: Absence of new skin breakdown  Description: 1.  Monitor for areas of redness and/or skin breakdown  2.  Assess vascular access sites hourly  3.  Every 4-6 hours minimum:  Change oxygen saturation probe site  4.  Every 4-6 hours:  If on nasal continuous positive airway pressure, respiratory therapy assess nares and determine need for appliance change or resting period.  Outcome: Progressing     Problem: Safety - Adult  Goal: Free from fall injury  Outcome: Progressing  Flowsheets (Taken 11/7/2024 0503)  Free From Fall Injury:   Instruct family/caregiver on patient safety   Based on caregiver fall risk screen, instruct family/caregiver to ask for assistance with transferring infant if caregiver noted to have fall risk factors     Problem: Pain  Goal: Verbalizes/displays adequate comfort level or baseline comfort level  Outcome: Progressing  Flowsheets (Taken 11/7/2024 0503)  Verbalizes/displays adequate comfort level or baseline comfort level:   Encourage patient to monitor pain and request assistance   Assess pain using appropriate pain scale   Administer analgesics based on type and severity of pain and evaluate response   Implement non-pharmacological measures as appropriate and evaluate response   Consider cultural and social influences on pain and pain management   Notify Licensed Independent Practitioner if interventions unsuccessful or patient reports new pain

## 2024-11-07 NOTE — PROGRESS NOTES
Speech Therapy   Discharge/no charge      Referral rec'd, chart reviewed. Based on chart review, pt not exhibiting any communication, cognitive or swallowing deficits. Spoke with RN who confirmed pt having no difficulty. Spoke with pt who reported no difficulty with communication, cognition or swallowing and politely declined assessment. Pt discharged from  Speech Therapy services.    Светлана Garcia MA, CCC-SLP [id number]  Speech-Language Pathologist  Pager  [phone]

## 2024-11-07 NOTE — PLAN OF CARE
Problem: Discharge Planning  Goal: Discharge to home or other facility with appropriate resources  11/7/2024 1014 by Elizabeth Fair RN  Outcome: Adequate for Discharge  11/7/2024 1013 by Elizabeth Fair RN  Outcome: Progressing  11/7/2024 0503 by Juanita France RN  Outcome: Progressing     Problem: Skin/Tissue Integrity  Goal: Absence of new skin breakdown  Description: 1.  Monitor for areas of redness and/or skin breakdown  2.  Assess vascular access sites hourly  3.  Every 4-6 hours minimum:  Change oxygen saturation probe site  4.  Every 4-6 hours:  If on nasal continuous positive airway pressure, respiratory therapy assess nares and determine need for appliance change or resting period.  11/7/2024 1014 by Elizabeth Fair RN  Outcome: Adequate for Discharge  11/7/2024 1013 by Elizabeth Fair RN  Outcome: Progressing  11/7/2024 0503 by Juanita France RN  Outcome: Progressing     Problem: Safety - Adult  Goal: Free from fall injury  11/7/2024 1014 by Elizabeth Fair RN  Outcome: Adequate for Discharge  11/7/2024 0503 by Juanita France RN  Outcome: Progressing     Problem: Pain  Goal: Verbalizes/displays adequate comfort level or baseline comfort level  11/7/2024 1014 by Elizabeth Fair RN  Outcome: Adequate for Discharge  11/7/2024 0503 by Juanita France RN  Outcome: Progressing     Problem: ABCDS Injury Assessment  Goal: Absence of physical injury  11/7/2024 1014 by Elizabeth Fair RN  Outcome: Adequate for Discharge  11/7/2024 0503 by Juanita France RN  Outcome: Progressing

## 2024-11-07 NOTE — CARE COORDINATION
Case Management Assessment            Discharge Note                    Date / Time of Note: 11/7/2024 11:44 AM                  Discharge Note Completed by: Roxanne Vang MSW, LSW    Patient Name: Roxanne Nicholas   YOB: 1990  Diagnosis: Lumbar disc herniation with radiculopathy [M51.16]   Date / Time: 11/4/2024  6:49 PM    Current PCP: Timur Shea MD  Clinic patient: No    Hospitalization in the last 30 days: No       Advance Directives:  Code Status: Full Code  [place] DNR form completed and on chart: No    Financial:  Payor: [employer] / Plan: [employer] [place] MEDICAID / Product Type: *No Product type* /      Pharmacy:    [facility]/pharmacy #[id number] - [place], [place] - [address] - P [phone] - F [phone]  [address] [address]  Phone: [phone] Fax: [phone]    [address] - P [phone] - F [phone]  [address] [place] [address]  [address]  [facility] [address]  Phone: [phone] Fax: [phone]      Assistance purchasing medications?:    Assistance provided by Case Management: None at this time    Does patient want to participate in local refill/ meds to beds program?:      Meds To Beds General Rules:  1. Can ONLY be done Monday- Friday between 8:30am-5pm  2. Prescription(s) must be in pharmacy by 3pm to be filled same day  3.Copy of patient's insurance/ prescription drug card and patient face sheet must be sent along with the prescription(s)  4. Cost of Rx cannot be added to hospital bill. If financial assistance is needed, please contact unit  or ;  or  CANNOT provide pharmacy voucher for patients co-pays  5. Patients can then  the prescription on their way out of the hospital at discharge, or pharmacy can deliver to the bedside if staff is available. (payment due at time of pick-up or delivery - cash, check, or card accepted)     Able to afford home  medications/ co-pay costs: Yes    ADLS:  Current PT AM-PAC Score: 14 /24  Current OT AM-PAC Score: 15 /24    DISCHARGE Disposition: Inpatient Rehab: [facility]  Phone:   Fax:      LOC at discharge: Not Applicable  PRISCILLA Completed: No    Notification completed in HENS/PAS?:  No    IMM Completed:   No- pt left prior to cm rounding          Transportation:  Transportation PLAN for discharge: self   Mode of Transport: Lyft      Referrals made at DISCHARGE for outpatient continued care:  Not Applicable    Additional CM Notes:     Pt left prior to  meeting with her. Pre-cert is approved for [facility] rehab. Pt has a court appearance today at 1pm. Pt scheduled a lyft/uber for herself to go to court then she will go to [facility]. [facility] admissions are aware of this plan and are in agreement.     COVID Result:    Lab Results   Component Value Date/Time    COVID19 DETECTED 09/06/2024 04:20 PM    COVID19 NOT DETECTED 05/08/2024 05:20 PM    COVID19 Not Detected 04/23/2020 11:34 PM       The Plan for Transition of Care is related to the following treatment goals of Lumbar disc herniation with radiculopathy [M51.16]    The Patient and/or patient representative Roxanne and her family were provided with a choice of provider and agrees with the discharge plan Yes    Freedom of choice list was provided with basic dialogue that supports the patient's individualized plan of care/goals and shares the quality data associated with the providers. Yes    Care Transitions patient: No    SAM Valdes, LSW  [facility]  Case Management Department  Ph: [phone]

## 2024-11-08 LAB
BACTERIA BLD CULT ORG #2: NORMAL
BACTERIA BLD CULT: NORMAL

## 2024-11-09 LAB — ACTH PLAS-MCNC: <2 PG/ML (ref 7–63)

## 2024-12-26 ENCOUNTER — HOSPITAL ENCOUNTER (EMERGENCY)
Age: 34
Discharge: HOME | End: 2024-12-27
Attending: EMERGENCY MEDICINE
Payer: COMMERCIAL

## 2024-12-26 DIAGNOSIS — M54.50 ACUTE ON CHRONIC LOW BACK PAIN: Primary | ICD-10-CM

## 2024-12-26 DIAGNOSIS — G89.29 ACUTE ON CHRONIC LOW BACK PAIN: Primary | ICD-10-CM

## 2024-12-26 PROCEDURE — 99283 EMERGENCY DEPT VISIT LOW MDM: CPT

## 2024-12-26 RX ORDER — OXYCODONE HYDROCHLORIDE 5 MG/1
5 TABLET ORAL ONCE
Status: COMPLETED | OUTPATIENT
Start: 2024-12-27 | End: 2024-12-27

## 2024-12-26 ASSESSMENT — PAIN DESCRIPTION - PAIN TYPE: TYPE: ACUTE PAIN

## 2024-12-26 ASSESSMENT — PAIN DESCRIPTION - DESCRIPTORS: DESCRIPTORS: ACHING

## 2024-12-26 ASSESSMENT — PAIN DESCRIPTION - ONSET: ONSET: ON-GOING

## 2024-12-26 ASSESSMENT — PAIN DESCRIPTION - ORIENTATION: ORIENTATION: LOWER

## 2024-12-26 ASSESSMENT — PAIN DESCRIPTION - FREQUENCY: FREQUENCY: CONTINUOUS

## 2024-12-26 ASSESSMENT — PAIN - FUNCTIONAL ASSESSMENT
PAIN_FUNCTIONAL_ASSESSMENT: 0-10
PAIN_FUNCTIONAL_ASSESSMENT: ACTIVITIES ARE NOT PREVENTED

## 2024-12-26 ASSESSMENT — PAIN SCALES - GENERAL: PAINLEVEL_OUTOF10: 10

## 2024-12-26 ASSESSMENT — PAIN DESCRIPTION - LOCATION: LOCATION: BACK

## 2024-12-27 VITALS
HEART RATE: 94 BPM | RESPIRATION RATE: 18 BRPM | TEMPERATURE: 98.1 F | DIASTOLIC BLOOD PRESSURE: 83 MMHG | BODY MASS INDEX: 29.26 KG/M2 | OXYGEN SATURATION: 99 % | HEIGHT: 64 IN | WEIGHT: 171.4 LBS | SYSTOLIC BLOOD PRESSURE: 122 MMHG

## 2024-12-27 PROCEDURE — 6370000000 HC RX 637 (ALT 250 FOR IP): Performed by: EMERGENCY MEDICINE

## 2024-12-27 RX ORDER — OXYCODONE HYDROCHLORIDE 5 MG/1
5 TABLET ORAL EVERY 6 HOURS PRN
Qty: 20 TABLET | Refills: 0 | Status: SHIPPED | OUTPATIENT
Start: 2024-12-27 | End: 2025-01-01

## 2024-12-27 RX ADMIN — TIZANIDINE 4 MG: 4 TABLET ORAL at 00:28

## 2024-12-27 RX ADMIN — OXYCODONE 5 MG: 5 TABLET ORAL at 00:28

## 2024-12-27 ASSESSMENT — ENCOUNTER SYMPTOMS
RHINORRHEA: 0
EYE PAIN: 0
SHORTNESS OF BREATH: 0
VOMITING: 0
CONSTIPATION: 0
NAUSEA: 0
DIARRHEA: 0
BACK PAIN: 1
EYE REDNESS: 0
COUGH: 0
ABDOMINAL PAIN: 0

## 2024-12-27 NOTE — ED PROVIDER NOTES
[facility] ED  EMERGENCY DEPARTMENT ENCOUNTER        Pt Name: Roxanne Nicholas  MRN: [id number]  Birthdate 1990  Date of evaluation: 12/26/2024  Provider: Senia Hennessy DO  PCP: Timur Shea MD  Note Started: 11:48 PM EST 12/26/24    CHIEF COMPLAINT      Back Pain    HISTORY OF PRESENT ILLNESS: 1 or more Elements     Chief Complaint   Patient presents with    Back Pain     Pt arrives with c/o lower back pain that started approx a month ago.      History from : Patient  Limitations to history : None    Roxanne Nicholas is a 34 y.o. female who presents to the emergency department secondary to concern for acute on chronic low back pain.  Reports that she ran out of her medication and is having uncontrolled back pain.  She has been having this back pain for several months and she did have an MRI done on 11/04/2024 which showed acute/subacute left paracentral/medial foraminal posterior disc herniation at L5-S1 indenting the thecal sac and narrowing the left lateral recess.  She was on oxycodone and Zanaflex which has been controlling the pain to a tolerable level, however she ran out of her oxycodone and does not see neurology till next week.  States that she called her primary care, but did not write prescription for her. Denies any new injuries of falls. Denies any need bowel/bladder incontinence.    Past medical history noted below. Aside from what is stated above denies any other symptoms or modifying factors.   reports that she has never smoked. She has never used smokeless tobacco. She reports current alcohol use. She reports current drug use. Drug: Marijuana (Weed).  Nursing Notes were all reviewed and agreed with or any disagreements addressed in HPI/MDM.  REVIEW OF SYSTEMS :    Review of Systems   Constitutional:  Negative for chills and fever.   HENT:  Negative for congestion and rhinorrhea.    Eyes:  Negative for pain and redness.   Respiratory:  Negative for cough and  List as of 12/27/2024 12:49 AM        START taking these medications    Details   oxyCODONE (ROXICODONE) 5 MG immediate release tablet Take 1 tablet by mouth every 6 hours as needed for Pain for up to 5 days. Intended supply: 5 days. Take lowest dose possible to manage pain Max Daily Amount: 20 mg, Disp-20 tablet, R-0Normal                (Please note that portions of this note were completed with a voice recognition program.  Efforts were made to edit the dictations but occasionally words are mis-transcribed.)    Senia Hennessy DO (electronically signed)  Attending Emergency Physician        Senia Hennessy DO  12/27/24 0137

## 2025-01-25 ENCOUNTER — HOSPITAL ENCOUNTER (EMERGENCY)
Age: 35
Discharge: LEFT BEFORE DISCHARGE | End: 2025-01-26
Attending: EMERGENCY MEDICINE

## 2025-01-25 VITALS
HEART RATE: 115 BPM | DIASTOLIC BLOOD PRESSURE: 84 MMHG | TEMPERATURE: 98.1 F | BODY MASS INDEX: 29.02 KG/M2 | HEIGHT: 64 IN | RESPIRATION RATE: 18 BRPM | SYSTOLIC BLOOD PRESSURE: 127 MMHG | WEIGHT: 170 LBS | OXYGEN SATURATION: 99 %

## 2025-01-25 ASSESSMENT — PAIN DESCRIPTION - LOCATION: LOCATION: FOOT

## 2025-01-25 ASSESSMENT — PAIN - FUNCTIONAL ASSESSMENT: PAIN_FUNCTIONAL_ASSESSMENT: 0-10

## 2025-01-25 ASSESSMENT — PAIN DESCRIPTION - ORIENTATION: ORIENTATION: LEFT;RIGHT

## 2025-01-25 ASSESSMENT — PAIN SCALES - GENERAL: PAINLEVEL_OUTOF10: 10

## 2025-01-26 ENCOUNTER — HOSPITAL ENCOUNTER (EMERGENCY)
Age: 35
Discharge: HOME | End: 2025-01-26
Payer: COMMERCIAL

## 2025-01-26 VITALS
TEMPERATURE: 98 F | SYSTOLIC BLOOD PRESSURE: 129 MMHG | HEART RATE: 97 BPM | OXYGEN SATURATION: 99 % | RESPIRATION RATE: 14 BRPM | DIASTOLIC BLOOD PRESSURE: 74 MMHG

## 2025-01-26 DIAGNOSIS — Z76.0 ENCOUNTER FOR MEDICATION REFILL: Primary | ICD-10-CM

## 2025-01-26 PROCEDURE — 99283 EMERGENCY DEPT VISIT LOW MDM: CPT

## 2025-01-26 PROCEDURE — 6370000000 HC RX 637 (ALT 250 FOR IP): Performed by: REGISTERED NURSE

## 2025-01-26 RX ORDER — OXYCODONE HYDROCHLORIDE 5 MG/1
TABLET ORAL
COMMUNITY

## 2025-01-26 RX ORDER — TIZANIDINE 2 MG/1
TABLET ORAL
COMMUNITY
Start: 2024-12-19

## 2025-01-26 RX ORDER — OXYCODONE HYDROCHLORIDE 5 MG/1
5 TABLET ORAL ONCE
Status: COMPLETED | OUTPATIENT
Start: 2025-01-26 | End: 2025-01-26

## 2025-01-26 RX ORDER — GABAPENTIN 800 MG/1
800 TABLET ORAL
COMMUNITY

## 2025-01-26 RX ADMIN — OXYCODONE 5 MG: 5 TABLET ORAL at 16:37

## 2025-01-26 ASSESSMENT — PAIN SCALES - GENERAL: PAINLEVEL_OUTOF10: 8

## 2025-01-26 ASSESSMENT — PAIN - FUNCTIONAL ASSESSMENT
PAIN_FUNCTIONAL_ASSESSMENT: PREVENTS OR INTERFERES SOME ACTIVE ACTIVITIES AND ADLS
PAIN_FUNCTIONAL_ASSESSMENT: 0-10

## 2025-01-26 ASSESSMENT — PAIN DESCRIPTION - PAIN TYPE: TYPE: CHRONIC PAIN

## 2025-01-26 ASSESSMENT — PAIN DESCRIPTION - DESCRIPTORS: DESCRIPTORS: ACHING;DISCOMFORT

## 2025-01-26 ASSESSMENT — LIFESTYLE VARIABLES: HOW OFTEN DO YOU HAVE A DRINK CONTAINING ALCOHOL: NEVER

## 2025-01-26 ASSESSMENT — PAIN DESCRIPTION - ORIENTATION: ORIENTATION: LEFT;RIGHT

## 2025-01-26 ASSESSMENT — PAIN DESCRIPTION - LOCATION: LOCATION: FOOT;LEG;HIP

## 2025-01-26 ASSESSMENT — PAIN DESCRIPTION - FREQUENCY: FREQUENCY: CONTINUOUS

## 2025-01-26 ASSESSMENT — PAIN DESCRIPTION - ONSET: ONSET: ON-GOING

## 2025-01-26 NOTE — ED PROVIDER NOTES
[facility] EMERGENCY DEPARTMENT  EMERGENCY DEPARTMENT ENCOUNTER        Pt Name: Roxanne Nicholas  MRN: [id number]  Birthdate 1990  Date of evaluation: 1/26/2025  Provider: ANASTACIA Ash CNP  PCP: No primary care provider on file.    This patient was not seen and evaluated by the attending physician No att. providers found.    I have evaluated this patient. My supervising physician was available for consultation.      CHIEF COMPLAINT       Chief Complaint   Patient presents with    Medication Refill     Reports missed appointment at [facility] this past week, out of medications. Reports has not had for a week and pain is unmanageable at home. Takes gabapentin, oxycodone & zanaflex. Reports next appointment is on 1/28       HISTORY OF PRESENT ILLNESS   (Location/Symptom, Timing/Onset, Context/Setting, Quality, Duration, Modifying Factors, Severity)  Note limiting factors.     History from : Patient  Roxanne Nicholas is a 34 y.o. female who presents via private car requesting prescription refills.  Patient states that she has chronic back pain and is seeing [facility] for this.  She states that she previously had a prescription that carried her from November to December for gabapentin and oxycodone.  She states that approximately 1 month ago she ran out of the gabapentin.  She reports being seen in the emergency department in late December for her chronic back pain and was provided with a prescription for 20 oxycodone which she had stretched until this past week.  She had an appointment on Monday with [facility] to start with pain management but missed the appointment due to transportation.  She states that she has an appointment scheduled for this coming Tuesday for evaluation and to establish care with pain management.  She has Zanaflex that she has been taking.  She states that her pain is not worse than normal and is just not well-controlled.  She denies any perineal

## 2025-02-16 ENCOUNTER — HOSPITAL ENCOUNTER (EMERGENCY)
Age: 35
Discharge: HOME | End: 2025-02-16
Attending: EMERGENCY MEDICINE
Payer: COMMERCIAL

## 2025-02-16 ENCOUNTER — APPOINTMENT (OUTPATIENT)
Dept: GENERAL RADIOLOGY | Age: 35
End: 2025-02-16
Payer: COMMERCIAL

## 2025-02-16 VITALS
RESPIRATION RATE: 17 BRPM | SYSTOLIC BLOOD PRESSURE: 103 MMHG | DIASTOLIC BLOOD PRESSURE: 62 MMHG | OXYGEN SATURATION: 96 % | WEIGHT: 170 LBS | BODY MASS INDEX: 29.18 KG/M2 | TEMPERATURE: 99.3 F | HEART RATE: 97 BPM

## 2025-02-16 DIAGNOSIS — M62.838 SPASM OF MUSCLE: Primary | ICD-10-CM

## 2025-02-16 LAB
ALBUMIN SERPL-MCNC: 3.6 G/DL (ref 3.4–5)
ALBUMIN/GLOB SERPL: 1.4 {RATIO} (ref 1.1–2.2)
ALP SERPL-CCNC: 77 U/L (ref 40–129)
ALT SERPL-CCNC: 20 U/L (ref 10–40)
ANION GAP SERPL CALCULATED.3IONS-SCNC: 11 MMOL/L (ref 3–16)
AST SERPL-CCNC: 19 U/L (ref 15–37)
BASOPHILS # BLD: 0 K/UL (ref 0–0.2)
BASOPHILS NFR BLD: 0.4 %
BILIRUB SERPL-MCNC: <0.2 MG/DL (ref 0–1)
BUN SERPL-MCNC: 13 MG/DL (ref 7–20)
CALCIUM SERPL-MCNC: 8.3 MG/DL (ref 8.3–10.6)
CHLORIDE SERPL-SCNC: 106 MMOL/L (ref 99–110)
CK SERPL-CCNC: 103 U/L (ref 26–192)
CO2 SERPL-SCNC: 21 MMOL/L (ref 21–32)
CREAT SERPL-MCNC: 0.8 MG/DL (ref 0.6–1.1)
DEPRECATED RDW RBC AUTO: 13.2 % (ref 12.4–15.4)
EOSINOPHIL # BLD: 0.2 K/UL (ref 0–0.6)
EOSINOPHIL NFR BLD: 2.2 %
ETHANOLAMINE SERPL-MCNC: NORMAL MG/DL (ref 0–0.08)
GFR SERPLBLD CREATININE-BSD FMLA CKD-EPI: >90 ML/MIN/{1.73_M2}
GLUCOSE SERPL-MCNC: 103 MG/DL (ref 70–99)
HCG SERPL QL: NEGATIVE
HCT VFR BLD AUTO: 42.3 % (ref 36–48)
HGB BLD-MCNC: 14.3 G/DL (ref 12–16)
LACTATE BLDV-SCNC: 1.9 MMOL/L (ref 0.4–2)
LIPASE SERPL-CCNC: 25 U/L (ref 13–60)
LYMPHOCYTES # BLD: 2.4 K/UL (ref 1–5.1)
LYMPHOCYTES NFR BLD: 30.2 %
MCH RBC QN AUTO: 31.5 PG (ref 26–34)
MCHC RBC AUTO-ENTMCNC: 33.8 G/DL (ref 31–36)
MCV RBC AUTO: 93.2 FL (ref 80–100)
MONOCYTES # BLD: 0.9 K/UL (ref 0–1.3)
MONOCYTES NFR BLD: 10.9 %
NEUTROPHILS # BLD: 4.6 K/UL (ref 1.7–7.7)
NEUTROPHILS NFR BLD: 56.3 %
PLATELET # BLD AUTO: 274 K/UL (ref 135–450)
PMV BLD AUTO: 8.6 FL (ref 5–10.5)
POTASSIUM SERPL-SCNC: 4.1 MMOL/L (ref 3.5–5.1)
PROT SERPL-MCNC: 6.1 G/DL (ref 6.4–8.2)
RBC # BLD AUTO: 4.54 M/UL (ref 4–5.2)
SODIUM SERPL-SCNC: 138 MMOL/L (ref 136–145)
WBC # BLD AUTO: 8.1 K/UL (ref 4–11)

## 2025-02-16 PROCEDURE — 2580000003 HC RX 258: Performed by: EMERGENCY MEDICINE

## 2025-02-16 PROCEDURE — 36415 COLL VENOUS BLD VENIPUNCTURE: CPT

## 2025-02-16 PROCEDURE — 99284 EMERGENCY DEPT VISIT MOD MDM: CPT

## 2025-02-16 PROCEDURE — 83690 ASSAY OF LIPASE: CPT

## 2025-02-16 PROCEDURE — 80053 COMPREHEN METABOLIC PANEL: CPT

## 2025-02-16 PROCEDURE — 82077 ASSAY SPEC XCP UR&BREATH IA: CPT

## 2025-02-16 PROCEDURE — 96375 TX/PRO/DX INJ NEW DRUG ADDON: CPT

## 2025-02-16 PROCEDURE — 96374 THER/PROPH/DIAG INJ IV PUSH: CPT

## 2025-02-16 PROCEDURE — 96372 THER/PROPH/DIAG INJ SC/IM: CPT

## 2025-02-16 PROCEDURE — 82550 ASSAY OF CK (CPK): CPT

## 2025-02-16 PROCEDURE — 6360000002 HC RX W HCPCS: Performed by: EMERGENCY MEDICINE

## 2025-02-16 PROCEDURE — 85025 COMPLETE CBC W/AUTO DIFF WBC: CPT

## 2025-02-16 PROCEDURE — 71045 X-RAY EXAM CHEST 1 VIEW: CPT

## 2025-02-16 PROCEDURE — 84703 CHORIONIC GONADOTROPIN ASSAY: CPT

## 2025-02-16 PROCEDURE — 83605 ASSAY OF LACTIC ACID: CPT

## 2025-02-16 RX ORDER — OLANZAPINE 10 MG/2ML
2.5 INJECTION, POWDER, FOR SOLUTION INTRAMUSCULAR ONCE
Status: COMPLETED | OUTPATIENT
Start: 2025-02-16 | End: 2025-02-16

## 2025-02-16 RX ORDER — WATER 10 ML/10ML
INJECTION INTRAMUSCULAR; INTRAVENOUS; SUBCUTANEOUS
Status: DISCONTINUED
Start: 2025-02-16 | End: 2025-02-16 | Stop reason: HOSPADM

## 2025-02-16 RX ORDER — 0.9 % SODIUM CHLORIDE 0.9 %
500 INTRAVENOUS SOLUTION INTRAVENOUS ONCE
Status: COMPLETED | OUTPATIENT
Start: 2025-02-16 | End: 2025-02-16

## 2025-02-16 RX ORDER — KETOROLAC TROMETHAMINE 15 MG/ML
15 INJECTION, SOLUTION INTRAMUSCULAR; INTRAVENOUS ONCE
Status: COMPLETED | OUTPATIENT
Start: 2025-02-16 | End: 2025-02-16

## 2025-02-16 RX ORDER — KETOROLAC TROMETHAMINE 10 MG/1
10 TABLET, FILM COATED ORAL EVERY 8 HOURS PRN
Qty: 15 TABLET | Refills: 0 | Status: SHIPPED | OUTPATIENT
Start: 2025-02-16

## 2025-02-16 RX ORDER — NAPROXEN 500 MG/1
500 TABLET ORAL 2 TIMES DAILY WITH MEALS
Qty: 60 TABLET | Refills: 5 | Status: SHIPPED | OUTPATIENT
Start: 2025-02-16

## 2025-02-16 RX ORDER — ORPHENADRINE CITRATE 30 MG/ML
60 INJECTION INTRAMUSCULAR; INTRAVENOUS ONCE
Status: COMPLETED | OUTPATIENT
Start: 2025-02-16 | End: 2025-02-16

## 2025-02-16 RX ADMIN — SODIUM CHLORIDE 500 ML: 9 INJECTION, SOLUTION INTRAVENOUS at 06:12

## 2025-02-16 RX ADMIN — ORPHENADRINE CITRATE 60 MG: 60 INJECTION INTRAMUSCULAR; INTRAVENOUS at 06:10

## 2025-02-16 RX ADMIN — KETOROLAC TROMETHAMINE 15 MG: 15 INJECTION, SOLUTION INTRAMUSCULAR; INTRAVENOUS at 06:19

## 2025-02-16 RX ADMIN — OLANZAPINE 2.5 MG: 10 INJECTION, POWDER, FOR SOLUTION INTRAMUSCULAR at 06:18

## 2025-02-16 NOTE — ED PROVIDER NOTES
[facility] EMERGENCY DEPARTMENT  EMERGENCY DEPARTMENT ENCOUNTER        Pt Name: Roxanne Nicholas  MRN: [id number]  Birthdate 1990  Date of evaluation: 2/16/2025  Provider: Javed Noriega MD  PCP: No primary care provider on file.  Note Started: 7:17 AM EST 2/16/25    CHIEF COMPLAINT       Chief Complaint   Patient presents with    Spasms     Muscle spasms of lower extremities; pt states she's been out of her meds for few days.       HISTORY OF PRESENT ILLNESS: 1 or more Elements   History From: Patient        Roxanne Nicholas is a 34 y.o. female who presents for evaluation of muscle spasms of lower extremities.  Patient reports has a history of chronic muscle spasms.  She has had thorough workup for this and has seen specialist at [facility].  She reports that she is currently out of all of her home medications.  Reviewing medical record she has been treated with antispasmodics NSAIDs, as well as gabapentin and oxycodone.  She has had multiple ED visits for medication refills and states she is currently in pain management.  She denies new injury or trauma.  Reports chronic sensory deficits in the left lower extremity which are no different than baseline.  States she is not taking medications over the last few days.     Reviewing patient's medical record she has had multiple MRIs and most recent note from neurology at [facility] report the patient had functional neurologic disorder.     Nursing Notes were all reviewed and agreed with or any disagreements were addressed in the HPI.    REVIEW OF SYSTEMS :      Review of Systems    Positives and Pertinent negatives as per HPI.     SURGICAL HISTORY     Past Surgical History:   Procedure Laterality Date    ADENOIDECTOMY      CARDIAC SURGERY      PDA coiling    CHOLECYSTECTOMY  11/07/2011    HYSTERECTOMY (CERVIX STATUS UNKNOWN)  01/24/2017    Robotic Assisted TLH & BS    TONSILLECTOMY         CURRENTMEDICATIONS       Discharge Medication List

## 2025-03-05 ENCOUNTER — OFFICE VISIT (OUTPATIENT)
Age: 35
End: 2025-03-05

## 2025-03-05 VITALS
OXYGEN SATURATION: 97 % | DIASTOLIC BLOOD PRESSURE: 67 MMHG | SYSTOLIC BLOOD PRESSURE: 107 MMHG | TEMPERATURE: 98.3 F | HEART RATE: 93 BPM

## 2025-03-05 DIAGNOSIS — B96.89 ACUTE BACTERIAL BRONCHITIS: Primary | ICD-10-CM

## 2025-03-05 DIAGNOSIS — J20.8 ACUTE BACTERIAL BRONCHITIS: Primary | ICD-10-CM

## 2025-03-05 PROBLEM — F31.11 BIPOLAR I DISORDER, MOST RECENT EPISODE (OR CURRENT) MANIC, MILD (HCC): Chronic | Status: ACTIVE | Noted: 2018-08-22

## 2025-03-05 PROBLEM — Q25.0 PDA (PATENT DUCTUS ARTERIOSUS): Chronic | Status: ACTIVE | Noted: 2018-11-06

## 2025-03-05 PROBLEM — J30.89 CHRONIC NONSEASONAL ALLERGIC RHINITIS DUE TO POLLEN: Chronic | Status: ACTIVE | Noted: 2018-04-09

## 2025-03-05 PROBLEM — R87.619 ABNORMAL PAP SMEAR OF CERVIX: Status: ACTIVE | Noted: 2025-03-05

## 2025-03-05 PROBLEM — G90.A POSTURAL ORTHOSTATIC TACHYCARDIA SYNDROME (POTS): Chronic | Status: ACTIVE | Noted: 2024-05-16

## 2025-03-05 RX ORDER — BENZONATATE 100 MG/1
200 CAPSULE ORAL 3 TIMES DAILY PRN
Qty: 21 CAPSULE | Refills: 0 | Status: SHIPPED | OUTPATIENT
Start: 2025-03-05 | End: 2025-03-12

## 2025-03-05 RX ORDER — MULTIVITAMIN WITH FOLIC ACID 400 MCG
1 TABLET ORAL
COMMUNITY
Start: 2024-09-18

## 2025-03-05 RX ORDER — OXYBUTYNIN CHLORIDE 5 MG/1
TABLET ORAL
COMMUNITY

## 2025-03-05 RX ORDER — FOLIC ACID 1 MG/1
1 TABLET ORAL
COMMUNITY
Start: 2024-11-15

## 2025-03-05 RX ORDER — DOXYCYCLINE 100 MG/1
1 TABLET ORAL EVERY 12 HOURS
COMMUNITY
Start: 2024-09-18

## 2025-03-05 RX ORDER — DIAZEPAM 5 MG/1
TABLET ORAL
COMMUNITY
Start: 2024-09-18

## 2025-03-05 RX ORDER — ASPIRIN 81 MG/1
1 TABLET, CHEWABLE ORAL
COMMUNITY
Start: 2024-11-15

## 2025-03-05 RX ORDER — PSYLLIUM HUSK 0.4 G
CAPSULE ORAL
COMMUNITY

## 2025-03-05 RX ORDER — LANOLIN ALCOHOL/MO/W.PET/CERES
100 CREAM (GRAM) TOPICAL
COMMUNITY
Start: 2024-11-15

## 2025-03-05 RX ORDER — HYDROCORTISONE 5 MG/1
TABLET ORAL
COMMUNITY
Start: 2024-11-07

## 2025-03-05 RX ORDER — ISOSORBIDE MONONITRATE 30 MG/1
1 TABLET, EXTENDED RELEASE ORAL DAILY
COMMUNITY

## 2025-03-05 RX ORDER — ATORVASTATIN CALCIUM 80 MG/1
80 TABLET, FILM COATED ORAL
COMMUNITY
Start: 2024-12-17

## 2025-03-05 RX ORDER — CYANOCOBALAMIN 1000 UG/ML
INJECTION, SOLUTION INTRAMUSCULAR; SUBCUTANEOUS
COMMUNITY
Start: 2024-11-28

## 2025-03-05 RX ORDER — FLUDROCORTISONE ACETATE 0.1 MG/1
0.1 TABLET ORAL DAILY
COMMUNITY
Start: 2024-05-16

## 2025-03-05 RX ORDER — ALBUTEROL SULFATE 90 UG/1
2 INHALANT RESPIRATORY (INHALATION) EVERY 4 HOURS PRN
Qty: 6.7 G | Refills: 0 | Status: SHIPPED | OUTPATIENT
Start: 2025-03-05 | End: 2026-03-05

## 2025-03-05 RX ORDER — OMEPRAZOLE 40 MG/1
CAPSULE, DELAYED RELEASE ORAL
COMMUNITY
Start: 2024-09-18

## 2025-03-05 RX ORDER — HYDROXYZINE HYDROCHLORIDE 10 MG/1
TABLET, FILM COATED ORAL
COMMUNITY
Start: 2024-12-17

## 2025-03-05 RX ORDER — CITALOPRAM HYDROBROMIDE 10 MG/1
10 TABLET ORAL
COMMUNITY

## 2025-03-05 RX ORDER — ACETAMINOPHEN 500 MG
TABLET ORAL
COMMUNITY
Start: 2024-09-18

## 2025-03-05 RX ORDER — CLONIDINE HYDROCHLORIDE 0.1 MG/1
TABLET ORAL 2 TIMES DAILY
COMMUNITY

## 2025-03-05 RX ORDER — TIZANIDINE HYDROCHLORIDE 2 MG/1
2 CAPSULE, GELATIN COATED ORAL
COMMUNITY
Start: 2024-12-17

## 2025-03-05 RX ORDER — PROPRANOLOL HYDROCHLORIDE 40 MG/1
TABLET ORAL
COMMUNITY
Start: 2024-09-18

## 2025-03-05 RX ORDER — PRAMIPEXOLE DIHYDROCHLORIDE 0.12 MG/1
TABLET ORAL
COMMUNITY
Start: 2024-09-18

## 2025-03-05 RX ORDER — PREDNISONE 20 MG/1
40 TABLET ORAL
Qty: 10 TABLET | Refills: 0 | Status: SHIPPED | OUTPATIENT
Start: 2025-03-05 | End: 2025-03-10

## 2025-03-05 RX ORDER — RANOLAZINE 1000 MG/1
1 TABLET, EXTENDED RELEASE ORAL 2 TIMES DAILY
COMMUNITY

## 2025-03-05 RX ORDER — AZITHROMYCIN 250 MG/1
TABLET, FILM COATED ORAL
Qty: 6 TABLET | Refills: 0 | Status: SHIPPED | OUTPATIENT
Start: 2025-03-05 | End: 2025-03-10

## 2025-03-05 ASSESSMENT — ENCOUNTER SYMPTOMS
COUGH: 1
SHORTNESS OF BREATH: 0
WHEEZING: 1
RHINORRHEA: 1
HEMOPTYSIS: 0
SORE THROAT: 0

## 2025-03-05 NOTE — PATIENT INSTRUCTIONS
honey, and throat lozenges. I recommend 1-2 teaspoons of honey every hour for relief of throat irritation and coughing fits.  For sore throat, you can use throat sprays (Chloraseptic, Cepacol), sipping on warm beverages (tea with honey), ice cream, popsicles, sore throat lozenges, and warm salt water gargles.  For fever, aches/pains, you can take ibuprofen (Advil, Motrin) and acetaminophen (Tylenol), if needed. Do not take this if you have been told to avoid these medications.  Increase your fluid intake and get lots of rest.  Warm teas, humidifiers, nasal lavages, and sleeping in an inclined position are also helpful options that can lessen symptoms.  Follow up with your PCP within 5 days or, if unable, you can return to the clinic if symptoms persist beyond 5 days or if symptoms worsen  If you develop shortness of breath, increased work of breathing, lightheadedness, or chest pain, contact 911, or follow up immediately with the nearest Emergency Department for further evaluation

## 2025-03-05 NOTE — PROGRESS NOTES
Roxanne Nicholas (: 1990) is a 34 y.o. female, here for evaluation of the following chief complaint(s): Cough (For about 2 days, hurts to take deep breaths spitting bloody sputum up this morning )    Roxanne Nicholas is a: New patient.   Last Urgent Care Visit: Visit date not found   I have reviewed the patient's medications and basic medical history; see Medication Reconciliation.    ASSESSMENT/PLAN:    ICD-10-CM    1. Acute bacterial bronchitis  J20.8     B96.89           No point of care tests were completed  Symptoms include congestion, cough, wheezing, with exam findings of congestion, rhinorrhea, wheezing, there is concern Acute Bacterial Bronchitis  Low concern for respiratory distress, community acquired pneumonia, PE  Recommended:  Mucinex DM for cough with expectorant relief or plain DM if needed, advised to not use this during the night or with other DM products  Acetaminophen (Tylenol) and/or ibuprofen (Motrin, Advil) for aches/pains as needed, provided there are no contraindications  Prescribed:   Benzonatate prescribed for daytime cough relief  Albuterol for bronchospasms and wheezing  Prednisone for airway inflammation  Azithromycin for acute bacterial bronchitis  Recommended several other OTC and home remedy treatments for symptomatic relief as well  Strict ED follow up instructions provided  Discussed PCP follow up for persisting or worsening symptoms, or to return to the clinic if unable to obtain PCP follow up for worsening symptoms  The patient and/or the family were informed of the results of any tests, a time was given to answer questions, a plan was proposed and they agreed with plan. Reviewed AVS with treatment instructions and answered questions - pt/family expresses understanding and agreement with the discussed treatment plan and AVS instructions.  Patient did not have elevated blood pressure greater than 130/80. Therefore, referral to PCP for HTN is not

## 2025-03-08 ENCOUNTER — HOSPITAL ENCOUNTER (EMERGENCY)
Age: 35
Discharge: LEFT BEFORE DISCHARGE | End: 2025-03-08
Attending: EMERGENCY MEDICINE
Payer: COMMERCIAL

## 2025-03-08 VITALS
OXYGEN SATURATION: 98 % | RESPIRATION RATE: 18 BRPM | DIASTOLIC BLOOD PRESSURE: 61 MMHG | HEART RATE: 117 BPM | SYSTOLIC BLOOD PRESSURE: 121 MMHG | TEMPERATURE: 97.8 F

## 2025-03-08 LAB
FLUAV RNA RESP QL NAA+PROBE: NOT DETECTED
FLUBV RNA RESP QL NAA+PROBE: NOT DETECTED
SARS-COV-2 RNA RESP QL NAA+PROBE: NOT DETECTED

## 2025-03-08 PROCEDURE — 87636 SARSCOV2 & INF A&B AMP PRB: CPT

## 2025-03-08 PROCEDURE — 4500000002 HC ER NO CHARGE

## 2025-03-08 NOTE — ED PROVIDER NOTES
I went to see patient, but she is not in the room.      Megan, Alexander Uriarte MD  03/08/25 0617

## 2025-03-10 ENCOUNTER — APPOINTMENT (OUTPATIENT)
Dept: GENERAL RADIOLOGY | Age: 35
End: 2025-03-10
Payer: COMMERCIAL

## 2025-03-10 ENCOUNTER — APPOINTMENT (OUTPATIENT)
Dept: CT IMAGING | Age: 35
End: 2025-03-10
Payer: COMMERCIAL

## 2025-03-10 ENCOUNTER — HOSPITAL ENCOUNTER (EMERGENCY)
Age: 35
Discharge: HOME | End: 2025-03-10
Attending: STUDENT IN AN ORGANIZED HEALTH CARE EDUCATION/TRAINING PROGRAM
Payer: COMMERCIAL

## 2025-03-10 VITALS
OXYGEN SATURATION: 99 % | HEART RATE: 92 BPM | RESPIRATION RATE: 16 BRPM | HEIGHT: 65 IN | BODY MASS INDEX: 26.66 KG/M2 | WEIGHT: 160 LBS | SYSTOLIC BLOOD PRESSURE: 142 MMHG | DIASTOLIC BLOOD PRESSURE: 92 MMHG | TEMPERATURE: 98.2 F

## 2025-03-10 DIAGNOSIS — J06.9 ACUTE UPPER RESPIRATORY INFECTION: Primary | ICD-10-CM

## 2025-03-10 DIAGNOSIS — J02.9 ACUTE PHARYNGITIS, UNSPECIFIED ETIOLOGY: ICD-10-CM

## 2025-03-10 LAB
ALBUMIN SERPL-MCNC: 3.8 G/DL (ref 3.4–5)
ALBUMIN/GLOB SERPL: 1.4 {RATIO} (ref 1.1–2.2)
ALP SERPL-CCNC: 90 U/L (ref 40–129)
ALT SERPL-CCNC: 20 U/L (ref 10–40)
ANION GAP SERPL CALCULATED.3IONS-SCNC: 9 MMOL/L (ref 3–16)
AST SERPL-CCNC: 20 U/L (ref 15–37)
BASOPHILS # BLD: 0 K/UL (ref 0–0.2)
BASOPHILS NFR BLD: 0.5 %
BILIRUB SERPL-MCNC: <0.2 MG/DL (ref 0–1)
BUN SERPL-MCNC: 8 MG/DL (ref 7–20)
CALCIUM SERPL-MCNC: 8.2 MG/DL (ref 8.3–10.6)
CHLORIDE SERPL-SCNC: 105 MMOL/L (ref 99–110)
CO2 SERPL-SCNC: 27 MMOL/L (ref 21–32)
CREAT SERPL-MCNC: 0.8 MG/DL (ref 0.6–1.1)
D-DIMER QUANTITATIVE: 0.41 UG/ML FEU (ref 0–0.6)
DEPRECATED RDW RBC AUTO: 13.4 % (ref 12.4–15.4)
EKG ATRIAL RATE: 107 BPM
EKG DIAGNOSIS: NORMAL
EKG P AXIS: 54 DEGREES
EKG P-R INTERVAL: 148 MS
EKG Q-T INTERVAL: 350 MS
EKG QRS DURATION: 76 MS
EKG QTC CALCULATION (BAZETT): 467 MS
EKG R AXIS: 51 DEGREES
EKG T AXIS: 38 DEGREES
EKG VENTRICULAR RATE: 107 BPM
EOSINOPHIL # BLD: 0.3 K/UL (ref 0–0.6)
EOSINOPHIL NFR BLD: 4.4 %
FLUAV RNA RESP QL NAA+PROBE: NOT DETECTED
FLUBV RNA RESP QL NAA+PROBE: NOT DETECTED
GFR SERPLBLD CREATININE-BSD FMLA CKD-EPI: >90 ML/MIN/{1.73_M2}
GLUCOSE SERPL-MCNC: 157 MG/DL (ref 70–99)
HCG SERPL QL: NEGATIVE
HCT VFR BLD AUTO: 42.7 % (ref 36–48)
HGB BLD-MCNC: 14.5 G/DL (ref 12–16)
LYMPHOCYTES # BLD: 1.6 K/UL (ref 1–5.1)
LYMPHOCYTES NFR BLD: 21.7 %
MCH RBC QN AUTO: 31.7 PG (ref 26–34)
MCHC RBC AUTO-ENTMCNC: 34 G/DL (ref 31–36)
MCV RBC AUTO: 93.3 FL (ref 80–100)
MONOCYTES # BLD: 0.5 K/UL (ref 0–1.3)
MONOCYTES NFR BLD: 7.3 %
NEUTROPHILS # BLD: 5 K/UL (ref 1.7–7.7)
NEUTROPHILS NFR BLD: 66.1 %
PLATELET # BLD AUTO: 273 K/UL (ref 135–450)
PMV BLD AUTO: 8.4 FL (ref 5–10.5)
POTASSIUM SERPL-SCNC: 3.9 MMOL/L (ref 3.5–5.1)
PROT SERPL-MCNC: 6.5 G/DL (ref 6.4–8.2)
RBC # BLD AUTO: 4.58 M/UL (ref 4–5.2)
S PYO AG THROAT QL: NEGATIVE
SARS-COV-2 RNA RESP QL NAA+PROBE: NOT DETECTED
SODIUM SERPL-SCNC: 141 MMOL/L (ref 136–145)
TROPONIN, HIGH SENSITIVITY: <6 NG/L (ref 0–14)
WBC # BLD AUTO: 7.5 K/UL (ref 4–11)

## 2025-03-10 PROCEDURE — 84484 ASSAY OF TROPONIN QUANT: CPT

## 2025-03-10 PROCEDURE — 80053 COMPREHEN METABOLIC PANEL: CPT

## 2025-03-10 PROCEDURE — 85379 FIBRIN DEGRADATION QUANT: CPT

## 2025-03-10 PROCEDURE — 93010 ELECTROCARDIOGRAM REPORT: CPT | Performed by: INTERNAL MEDICINE

## 2025-03-10 PROCEDURE — 70491 CT SOFT TISSUE NECK W/DYE: CPT

## 2025-03-10 PROCEDURE — 6360000004 HC RX CONTRAST MEDICATION: Performed by: STUDENT IN AN ORGANIZED HEALTH CARE EDUCATION/TRAINING PROGRAM

## 2025-03-10 PROCEDURE — 99285 EMERGENCY DEPT VISIT HI MDM: CPT

## 2025-03-10 PROCEDURE — 93005 ELECTROCARDIOGRAM TRACING: CPT | Performed by: STUDENT IN AN ORGANIZED HEALTH CARE EDUCATION/TRAINING PROGRAM

## 2025-03-10 PROCEDURE — 96361 HYDRATE IV INFUSION ADD-ON: CPT

## 2025-03-10 PROCEDURE — 87880 STREP A ASSAY W/OPTIC: CPT

## 2025-03-10 PROCEDURE — 71045 X-RAY EXAM CHEST 1 VIEW: CPT

## 2025-03-10 PROCEDURE — 85025 COMPLETE CBC W/AUTO DIFF WBC: CPT

## 2025-03-10 PROCEDURE — 6360000002 HC RX W HCPCS: Performed by: STUDENT IN AN ORGANIZED HEALTH CARE EDUCATION/TRAINING PROGRAM

## 2025-03-10 PROCEDURE — 2580000003 HC RX 258: Performed by: STUDENT IN AN ORGANIZED HEALTH CARE EDUCATION/TRAINING PROGRAM

## 2025-03-10 PROCEDURE — 84703 CHORIONIC GONADOTROPIN ASSAY: CPT

## 2025-03-10 PROCEDURE — 87636 SARSCOV2 & INF A&B AMP PRB: CPT

## 2025-03-10 PROCEDURE — 96360 HYDRATION IV INFUSION INIT: CPT

## 2025-03-10 RX ORDER — IOPAMIDOL 755 MG/ML
75 INJECTION, SOLUTION INTRAVASCULAR
Status: COMPLETED | OUTPATIENT
Start: 2025-03-10 | End: 2025-03-10

## 2025-03-10 RX ORDER — DEXAMETHASONE SODIUM PHOSPHATE 10 MG/ML
10 INJECTION, SOLUTION INTRAMUSCULAR; INTRAVENOUS ONCE
Status: COMPLETED | OUTPATIENT
Start: 2025-03-10 | End: 2025-03-10

## 2025-03-10 RX ORDER — SODIUM CHLORIDE, SODIUM LACTATE, POTASSIUM CHLORIDE, AND CALCIUM CHLORIDE .6; .31; .03; .02 G/100ML; G/100ML; G/100ML; G/100ML
1000 INJECTION, SOLUTION INTRAVENOUS ONCE
Status: COMPLETED | OUTPATIENT
Start: 2025-03-10 | End: 2025-03-10

## 2025-03-10 RX ADMIN — DEXAMETHASONE SODIUM PHOSPHATE 10 MG: 10 INJECTION INTRAMUSCULAR; INTRAVENOUS at 07:50

## 2025-03-10 RX ADMIN — SODIUM CHLORIDE, SODIUM LACTATE, POTASSIUM CHLORIDE, AND CALCIUM CHLORIDE 1000 ML: .6; .31; .03; .02 INJECTION, SOLUTION INTRAVENOUS at 06:18

## 2025-03-10 RX ADMIN — IOPAMIDOL 75 ML: 755 INJECTION, SOLUTION INTRAVENOUS at 06:01

## 2025-03-10 ASSESSMENT — PAIN - FUNCTIONAL ASSESSMENT: PAIN_FUNCTIONAL_ASSESSMENT: NONE - DENIES PAIN

## 2025-03-10 NOTE — ED NOTES
Unable to get discharge vitals.  Pt waiting in the hallway stating she is really late for court.  Took her own iv out.

## 2025-03-10 NOTE — DISCHARGE INSTRUCTIONS
Follow-up with your primary doctor soon as able for reevaluation.  Return to emergency department for any chest pain, increased shortness of breath, lightness or dizziness, decreased oral intake, decreased urine output, confusion or altered mental status or any new change or worsening symptoms were always here for reevaluation never hesitate to return.  You do have a pulse ox please take your oxygen saturations 3 times daily and return if oxygen saturations go below 90%.

## 2025-03-16 ENCOUNTER — HOSPITAL ENCOUNTER (EMERGENCY)
Age: 35
Discharge: HOME | End: 2025-03-16
Attending: EMERGENCY MEDICINE
Payer: COMMERCIAL

## 2025-03-16 ENCOUNTER — APPOINTMENT (OUTPATIENT)
Dept: GENERAL RADIOLOGY | Age: 35
End: 2025-03-16
Attending: EMERGENCY MEDICINE
Payer: COMMERCIAL

## 2025-03-16 VITALS
OXYGEN SATURATION: 100 % | DIASTOLIC BLOOD PRESSURE: 66 MMHG | WEIGHT: 167 LBS | SYSTOLIC BLOOD PRESSURE: 120 MMHG | HEIGHT: 65 IN | TEMPERATURE: 98.8 F | BODY MASS INDEX: 27.82 KG/M2 | RESPIRATION RATE: 20 BRPM | HEART RATE: 95 BPM

## 2025-03-16 DIAGNOSIS — R05.2 SUBACUTE COUGH: Primary | ICD-10-CM

## 2025-03-16 PROCEDURE — 96372 THER/PROPH/DIAG INJ SC/IM: CPT

## 2025-03-16 PROCEDURE — 6360000002 HC RX W HCPCS: Performed by: EMERGENCY MEDICINE

## 2025-03-16 PROCEDURE — 6370000000 HC RX 637 (ALT 250 FOR IP): Performed by: EMERGENCY MEDICINE

## 2025-03-16 PROCEDURE — 71046 X-RAY EXAM CHEST 2 VIEWS: CPT

## 2025-03-16 PROCEDURE — 99284 EMERGENCY DEPT VISIT MOD MDM: CPT

## 2025-03-16 RX ORDER — DEXAMETHASONE SODIUM PHOSPHATE 10 MG/ML
10 INJECTION, SOLUTION INTRAMUSCULAR; INTRAVENOUS ONCE
Status: COMPLETED | OUTPATIENT
Start: 2025-03-16 | End: 2025-03-16

## 2025-03-16 RX ORDER — PREDNISONE 20 MG/1
TABLET ORAL
Qty: 21 TABLET | Refills: 0 | Status: SHIPPED | OUTPATIENT
Start: 2025-03-16

## 2025-03-16 RX ORDER — IPRATROPIUM BROMIDE AND ALBUTEROL SULFATE 2.5; .5 MG/3ML; MG/3ML
1 SOLUTION RESPIRATORY (INHALATION) ONCE
Status: COMPLETED | OUTPATIENT
Start: 2025-03-16 | End: 2025-03-16

## 2025-03-16 RX ADMIN — IPRATROPIUM BROMIDE AND ALBUTEROL SULFATE 1 DOSE: 2.5; .5 SOLUTION RESPIRATORY (INHALATION) at 19:36

## 2025-03-16 RX ADMIN — DEXAMETHASONE SODIUM PHOSPHATE 10 MG: 10 INJECTION INTRAMUSCULAR; INTRAVENOUS at 19:36

## 2025-03-16 ASSESSMENT — PAIN - FUNCTIONAL ASSESSMENT: PAIN_FUNCTIONAL_ASSESSMENT: NONE - DENIES PAIN

## 2025-03-16 NOTE — ED PROVIDER NOTES
Emergency Department Provider Note  Location: [facility] EMERGENCY DEPARTMENT  3/16/2025     Patient Identification  Roxanne Nicholas is a 34 y.o. female    Chief Complaint  Cough (Per pt off and on cough that she feels like is she is not getting a deep breath. Pt stated new diagnosis of MS and uses walker. )          HPI  (History provided by patient)  Patient presents with ongoing cough and dyspnea.  She has a history of asthma as well as allergies.  She also has a history of functional neurologic weakness causing gait disturbance.  She uses a walker to ambulate.  She recently has been treated with azithromycin and prednisone for bronchitis.  Her symptoms do not seem to be improving.  She denies recent fevers.  No chest pain.  No vomiting or diarrhea.  She has been using Robitussin and DuoNebs.    Nursing Notes reviewed.    Allergies:   Allergies   Allergen Reactions    Bactrim [Sulfamethoxazole-Trimethoprim] Nausea Only    Buprenorphine-Naloxone     Penicillins Hives       Past medical history:  has a past medical history of Alpha-1-antitrypsin deficiency (HCC), Asthma, Bronchopulmonary dysplasia of  (HCC), Heart murmur, History of gallstones, and PFO (patent foramen ovale).    Past surgical history:  has a past surgical history that includes Cardiac surgery; Tonsillectomy; Adenoidectomy; Cholecystectomy (2011); and Hysterectomy (2017).    Home medications:   Prior to Admission medications    Medication Sig Start Date End Date Taking? Authorizing Provider   predniSONE (DELTASONE) 20 MG tablet Take 2 tablets  or 40 mg daily for 7 days, and then 1 tablet or 20 mg daily for 7 days 3/16/25  Yes JEANIE'Cooper Crowe MD   acetaminophen (TYLENOL) 500 MG tablet TAKE TWO TABLETS (1000 MG) BY MOUTH EVERY 8 HOURS AS NEEDED FOR PAIN 24   ProviderAnthony MD   aspirin 81 MG chewable tablet Take 1 tablet by mouth 11/15/24   Anthony Mandel MD   atorvastatin (LIPITOR) 80 MG tablet Take

## 2025-03-16 NOTE — DISCHARGE INSTRUCTIONS
Rest.  Continue take your medications as prescribed.  Follow-up with your primary care doctor in lung doctor.  Return with new concerns

## 2025-03-20 NOTE — ED PROVIDER NOTES
467, no significant ST elevation or depression    MDM:    34-year-old female present with history seen above.  Patient mildly tachycardic at 102 on presentation otherwise vitals are reassuring and patient afebrile satting 1 room air.  Tachycardia resolved on reevaluation.  CBC reveals no leukocytosis.  Hemoglobin is within normal limits.  CMP is overall nonactionable.  COVID and flu testing are negative.  Strep testing is negative.  D-dimer is nonelevated and have low suspicion for PE at this time and do not believe further workup is necessary from this perspective.  Pregnancy testing is negative.  EKG can be seen above.  Trope is nonelevated.  Chest x-ray is nonacute.  CT soft tissue neck was obtained given submandibular discomfort and is also nonacute.  Patient was given Decadron in the ED as well as a liter of fluids on reevaluation she states she is improving.  She is at baseline mental status she was ambulated in the ED with pulse ox and oxygen saturations remain within normal limits.  Clinically patient is well-appearing I do believe patient is safe for discharge as long as she is agreeable to strict return precautions and close follow-up which she is in patient discharged home    Clinical Impression:  1. Acute upper respiratory infection    2. Acute pharyngitis, unspecified etiology              Comment: Please note this report has been produced using speech recognition software and may contain errors related to that system including errors in grammar, punctuation, and spelling, as well as words and phrases that may be inappropriate.  Efforts were made to edit the dictations.        Diego Isbell MD  03/20/25 0216

## 2025-04-05 ENCOUNTER — HOSPITAL ENCOUNTER (EMERGENCY)
Age: 35
Discharge: HOME | End: 2025-04-05
Attending: EMERGENCY MEDICINE
Payer: COMMERCIAL

## 2025-04-05 VITALS
DIASTOLIC BLOOD PRESSURE: 89 MMHG | HEART RATE: 137 BPM | BODY MASS INDEX: 28.22 KG/M2 | SYSTOLIC BLOOD PRESSURE: 149 MMHG | HEIGHT: 65 IN | RESPIRATION RATE: 22 BRPM | TEMPERATURE: 98.1 F | OXYGEN SATURATION: 100 % | WEIGHT: 169.4 LBS

## 2025-04-05 DIAGNOSIS — F44.7 FUNCTIONAL NEUROLOGICAL SYMPTOM DISORDER WITH MIXED SYMPTOMS: ICD-10-CM

## 2025-04-05 DIAGNOSIS — M62.838 MUSCLE SPASM OF BOTH LOWER LEGS: Primary | ICD-10-CM

## 2025-04-05 PROCEDURE — 96372 THER/PROPH/DIAG INJ SC/IM: CPT

## 2025-04-05 PROCEDURE — 6370000000 HC RX 637 (ALT 250 FOR IP): Performed by: EMERGENCY MEDICINE

## 2025-04-05 PROCEDURE — 99284 EMERGENCY DEPT VISIT MOD MDM: CPT

## 2025-04-05 PROCEDURE — 6360000002 HC RX W HCPCS: Performed by: EMERGENCY MEDICINE

## 2025-04-05 RX ORDER — OXYCODONE HYDROCHLORIDE 5 MG/1
5 TABLET ORAL ONCE
Refills: 0 | Status: COMPLETED | OUTPATIENT
Start: 2025-04-05 | End: 2025-04-05

## 2025-04-05 RX ORDER — METHOCARBAMOL 750 MG/1
750 TABLET, FILM COATED ORAL
COMMUNITY
Start: 2025-03-18

## 2025-04-05 RX ORDER — KETOROLAC TROMETHAMINE 30 MG/ML
15 INJECTION, SOLUTION INTRAMUSCULAR; INTRAVENOUS ONCE
Status: COMPLETED | OUTPATIENT
Start: 2025-04-05 | End: 2025-04-05

## 2025-04-05 RX ORDER — OXYCODONE HYDROCHLORIDE 5 MG/1
5 TABLET ORAL EVERY 6 HOURS PRN
Qty: 12 TABLET | Refills: 0 | Status: SHIPPED | OUTPATIENT
Start: 2025-04-05 | End: 2025-04-08

## 2025-04-05 RX ADMIN — KETOROLAC TROMETHAMINE 15 MG: 30 INJECTION, SOLUTION INTRAMUSCULAR at 22:05

## 2025-04-05 RX ADMIN — OXYCODONE HYDROCHLORIDE 5 MG: 5 TABLET ORAL at 22:05

## 2025-04-05 ASSESSMENT — PAIN - FUNCTIONAL ASSESSMENT: PAIN_FUNCTIONAL_ASSESSMENT: 0-10

## 2025-04-05 ASSESSMENT — PAIN SCALES - GENERAL: PAINLEVEL_OUTOF10: 8

## 2025-04-05 ASSESSMENT — PAIN DESCRIPTION - PAIN TYPE: TYPE: ACUTE PAIN

## 2025-04-05 ASSESSMENT — PAIN DESCRIPTION - LOCATION: LOCATION: LEG

## 2025-04-05 ASSESSMENT — PAIN DESCRIPTION - FREQUENCY: FREQUENCY: CONTINUOUS

## 2025-04-05 ASSESSMENT — PAIN DESCRIPTION - ORIENTATION: ORIENTATION: RIGHT;LEFT

## 2025-04-06 NOTE — ED TRIAGE NOTES
Pt co bilateral leg pain, muscle stiffness, spasms for the last 3 days. Pt taking pain medications and muscle relaxer wo relief. Pt reports hx of FMD and MS diagnoses. Pt reports incontinence issues since September/October.

## 2025-04-06 NOTE — DISCHARGE INSTRUCTIONS
As discussed, please continue to follow-up with your primary care doctor, and with the neurology team and pain management team at the [facility].  You may take oxycodone as prescribed for severe pain, but use caution, as this medication can cause sedation, and you should not drink alcohol, drive, or operate machinery while taking this medication.

## 2025-04-06 NOTE — ED PROVIDER NOTES
NEEDED FOR RESTLESS LEGS    PREDNISONE (DELTASONE) 20 MG TABLET    Take 2 tablets  or 40 mg daily for 7 days, and then 1 tablet or 20 mg daily for 7 days    PROPRANOLOL (INDERAL) 40 MG TABLET    TAKE ONE TABLET BY MOUTH THREE TIMES DAILY FOR BLOOD PRESSURE AND FOR ANXIETY    PSYLLIUM PO    Take 1 tbsp in 8 oz water daily    RANOLAZINE (RANEXA) 1000 MG EXTENDED RELEASE TABLET    Take 1 tablet by mouth 2 times daily    THIAMINE 100 MG TABLET    Take 1 tablet by mouth    TIOTROPIUM (SPIRIVA HANDIHALER) 18 MCG INHALATION CAPSULE    Inhale 1 capsule into the lungs daily    TIZANIDINE (ZANAFLEX) 2 MG CAPSULE    Take 1 capsule by mouth    TIZANIDINE (ZANAFLEX) 2 MG TABLET    TAKE 1 TABLET (2 MG TOTAL) BY MOUTH 3 TIMES A DAY AS NEEDED FOR MUSCLE SPASMS    TIZANIDINE (ZANAFLEX) 4 MG TABLET    Take 1 tablet by mouth every 6 hours as needed (muscle aches)    VITAMIN D (VITAMIN D-1000 MAX ST) 25 MCG (1000 UT) TABS TABLET    Take 1 tablet every day by oral route as directed for 90 days.       Allergies     She is allergic to bactrim [sulfamethoxazole-trimethoprim], buprenorphine-naloxone, and penicillins.    Physical Exam     INITIAL VITALS: BP: (!) 149/89, Temp: 98.1 °F (36.7 °C), Pulse: (!) 137, Respirations: 22, SpO2: 100 %     General: Generally well-appearing, well-developed young woman.  She is uncomfortable, occasionally grimacing, but pleasantly conversational, and in no acute respiratory distress.    HEENT: Pupils are equal, round, and reactive to light. Extraocular muscles are intact.  Conjunctivae are clear and moist. No redness or drainage from the eyes.     Neck: Supple, with full range of motion.     Cardiovascular: 2+ radial pulses bilaterally.     Respiratory: Unlabored breathing with equal chest rise and fall.     Skin: Warm and dry, without rashes or ecchymoses, lacerations or abrasions.     Neuro: Alert and oriented x3.     Extremities: Warm and well-perfused, without clubbing, cyanosis, or edema.   The

## 2025-04-30 ENCOUNTER — HOSPITAL ENCOUNTER (EMERGENCY)
Age: 35
Discharge: HOME | End: 2025-04-30
Attending: EMERGENCY MEDICINE
Payer: COMMERCIAL

## 2025-04-30 VITALS
RESPIRATION RATE: 14 BRPM | WEIGHT: 175 LBS | SYSTOLIC BLOOD PRESSURE: 111 MMHG | TEMPERATURE: 98.4 F | OXYGEN SATURATION: 99 % | HEART RATE: 79 BPM | BODY MASS INDEX: 29.16 KG/M2 | DIASTOLIC BLOOD PRESSURE: 72 MMHG | HEIGHT: 65 IN

## 2025-04-30 DIAGNOSIS — M54.50 CHRONIC MIDLINE LOW BACK PAIN, UNSPECIFIED WHETHER SCIATICA PRESENT: Primary | ICD-10-CM

## 2025-04-30 DIAGNOSIS — G89.29 CHRONIC MIDLINE LOW BACK PAIN, UNSPECIFIED WHETHER SCIATICA PRESENT: Primary | ICD-10-CM

## 2025-04-30 PROCEDURE — 96372 THER/PROPH/DIAG INJ SC/IM: CPT

## 2025-04-30 PROCEDURE — 6360000002 HC RX W HCPCS: Performed by: EMERGENCY MEDICINE

## 2025-04-30 PROCEDURE — 99284 EMERGENCY DEPT VISIT MOD MDM: CPT

## 2025-04-30 PROCEDURE — 6370000000 HC RX 637 (ALT 250 FOR IP)

## 2025-04-30 RX ORDER — KETOROLAC TROMETHAMINE 30 MG/ML
30 INJECTION, SOLUTION INTRAMUSCULAR; INTRAVENOUS ONCE
Status: COMPLETED | OUTPATIENT
Start: 2025-04-30 | End: 2025-04-30

## 2025-04-30 RX ORDER — ONDANSETRON 4 MG/1
4 TABLET, ORALLY DISINTEGRATING ORAL ONCE
Status: COMPLETED | OUTPATIENT
Start: 2025-04-30 | End: 2025-04-30

## 2025-04-30 RX ORDER — KETOROLAC TROMETHAMINE 30 MG/ML
60 INJECTION, SOLUTION INTRAMUSCULAR; INTRAVENOUS ONCE
Status: DISCONTINUED | OUTPATIENT
Start: 2025-04-30 | End: 2025-04-30

## 2025-04-30 RX ORDER — MORPHINE SULFATE 4 MG/ML
10 INJECTION, SOLUTION INTRAMUSCULAR; INTRAVENOUS ONCE
Status: COMPLETED | OUTPATIENT
Start: 2025-04-30 | End: 2025-04-30

## 2025-04-30 RX ORDER — ONDANSETRON 4 MG/1
TABLET, ORALLY DISINTEGRATING ORAL
Status: COMPLETED
Start: 2025-04-30 | End: 2025-04-30

## 2025-04-30 RX ORDER — KETOROLAC TROMETHAMINE 30 MG/ML
60 INJECTION, SOLUTION INTRAMUSCULAR; INTRAVENOUS ONCE
Status: DISCONTINUED | OUTPATIENT
Start: 2025-04-30 | End: 2025-04-30 | Stop reason: CLARIF

## 2025-04-30 RX ADMIN — KETOROLAC TROMETHAMINE 30 MG: 30 INJECTION, SOLUTION INTRAMUSCULAR at 16:23

## 2025-04-30 RX ADMIN — MORPHINE SULFATE 10 MG: 4 INJECTION, SOLUTION INTRAMUSCULAR; INTRAVENOUS at 16:23

## 2025-04-30 RX ADMIN — ONDANSETRON 4 MG: 4 TABLET, ORALLY DISINTEGRATING ORAL at 17:14

## 2025-04-30 RX ADMIN — KETOROLAC TROMETHAMINE 30 MG: 30 INJECTION, SOLUTION INTRAMUSCULAR at 16:19

## 2025-04-30 ASSESSMENT — PAIN SCALES - GENERAL
PAINLEVEL_OUTOF10: 10
PAINLEVEL_OUTOF10: 10

## 2025-04-30 ASSESSMENT — PAIN - FUNCTIONAL ASSESSMENT: PAIN_FUNCTIONAL_ASSESSMENT: 0-10

## 2025-04-30 ASSESSMENT — PAIN DESCRIPTION - LOCATION: LOCATION: BACK

## 2025-04-30 ASSESSMENT — PAIN DESCRIPTION - ORIENTATION: ORIENTATION: MID

## 2025-05-01 NOTE — ED PROVIDER NOTES
[facility] EMERGENCY DEPARTMENT     EMERGENCY DEPARTMENT ENCOUNTER            Pt Name: Roxanne Nicholas   MRN: [id number]   Birthdate 1990   Date of evaluation: 2025   Provider: Delmy Hunter MD   PCP: Rosaline Wyatt APRN - NP   Note Started: 3:48 AM EDT 25          CHIEF COMPLAINT     Chief Complaint   Patient presents with    Back Pain             HISTORY OF PRESENT ILLNESS:       Roxanne Nicholas is a 34 y.o. female who presents chronic low back pain, followed at [facility], has had scans has had interventions she is Percocet at home but she said it was not working well enough for her to drive all the way to [facility] for follow-up appointment    Nursing Notes were all reviewed and agreed with, or any disagreements were addressed in the HPI.     REVIEW OF SYSTEMS :    Positives and Pertinent negatives as per HPI.  No lower extremity weakness or paresthesias no bladder or bowel disturbances this is similar to her normal low back pain    MEDICAL HISTORY   has a past medical history of Alpha-1-antitrypsin deficiency (HCC), Asthma, Bronchopulmonary dysplasia of  (HCC), Heart murmur, History of gallstones, and PFO (patent foramen ovale).    Past Surgical History:   Procedure Laterality Date    ADENOIDECTOMY      CARDIAC SURGERY      PDA coiling    CHOLECYSTECTOMY  2011    HYSTERECTOMY (CERVIX STATUS UNKNOWN)  2017    Robotic Assisted TLH & BS    TONSILLECTOMY        CURRENTMEDICATIONS       Discharge Medication List as of 2025  4:59 PM        CONTINUE these medications which have NOT CHANGED    Details   methocarbamol (ROBAXIN) 750 MG tablet 1 tabletHistorical Med      predniSONE (DELTASONE) 20 MG tablet Take 2 tablets  or 40 mg daily for 7 days, and then 1 tablet or 20 mg daily for 7 days, Disp-21 tablet, R-0Normal      acetaminophen (TYLENOL) 500 MG tablet TAKE TWO TABLETS (1000 MG) BY MOUTH EVERY 8 HOURS AS NEEDED FOR PAINHistorical Med

## 2025-06-03 ENCOUNTER — HOSPITAL ENCOUNTER (EMERGENCY)
Age: 35
Discharge: HOME | End: 2025-06-03
Attending: EMERGENCY MEDICINE
Payer: COMMERCIAL

## 2025-06-03 VITALS
RESPIRATION RATE: 16 BRPM | WEIGHT: 160 LBS | HEART RATE: 78 BPM | HEIGHT: 64 IN | BODY MASS INDEX: 27.31 KG/M2 | DIASTOLIC BLOOD PRESSURE: 71 MMHG | OXYGEN SATURATION: 98 % | TEMPERATURE: 97.6 F | SYSTOLIC BLOOD PRESSURE: 103 MMHG

## 2025-06-03 DIAGNOSIS — M79.605 BILATERAL LEG PAIN: Primary | ICD-10-CM

## 2025-06-03 DIAGNOSIS — M79.604 BILATERAL LEG PAIN: Primary | ICD-10-CM

## 2025-06-03 PROCEDURE — 6360000002 HC RX W HCPCS: Performed by: EMERGENCY MEDICINE

## 2025-06-03 PROCEDURE — 99284 EMERGENCY DEPT VISIT MOD MDM: CPT

## 2025-06-03 PROCEDURE — 96372 THER/PROPH/DIAG INJ SC/IM: CPT

## 2025-06-03 PROCEDURE — 6370000000 HC RX 637 (ALT 250 FOR IP): Performed by: EMERGENCY MEDICINE

## 2025-06-03 RX ORDER — SPIRONOLACTONE 25 MG/1
25 TABLET ORAL DAILY
COMMUNITY

## 2025-06-03 RX ORDER — KETOROLAC TROMETHAMINE 15 MG/ML
15 INJECTION, SOLUTION INTRAMUSCULAR; INTRAVENOUS ONCE
Status: COMPLETED | OUTPATIENT
Start: 2025-06-03 | End: 2025-06-03

## 2025-06-03 RX ORDER — NAPROXEN 500 MG/1
500 TABLET ORAL 2 TIMES DAILY WITH MEALS
Qty: 60 TABLET | Refills: 5 | Status: SHIPPED | OUTPATIENT
Start: 2025-06-03

## 2025-06-03 RX ORDER — FUROSEMIDE 20 MG/1
10 TABLET ORAL ONCE
Status: COMPLETED | OUTPATIENT
Start: 2025-06-03 | End: 2025-06-03

## 2025-06-03 RX ORDER — FUROSEMIDE 40 MG/1
40 TABLET ORAL ONCE
Status: DISCONTINUED | OUTPATIENT
Start: 2025-06-03 | End: 2025-06-03

## 2025-06-03 RX ADMIN — FUROSEMIDE 10 MG: 20 TABLET ORAL at 04:02

## 2025-06-03 RX ADMIN — KETOROLAC TROMETHAMINE 15 MG: 15 INJECTION, SOLUTION INTRAMUSCULAR; INTRAVENOUS at 04:05

## 2025-06-03 ASSESSMENT — PAIN DESCRIPTION - ORIENTATION
ORIENTATION: RIGHT;LEFT

## 2025-06-03 ASSESSMENT — PAIN DESCRIPTION - LOCATION
LOCATION: LEG

## 2025-06-03 ASSESSMENT — PAIN SCALES - GENERAL
PAINLEVEL_OUTOF10: 7

## 2025-06-03 ASSESSMENT — PAIN DESCRIPTION - DESCRIPTORS
DESCRIPTORS: DISCOMFORT
DESCRIPTORS: SHOOTING

## 2025-06-03 ASSESSMENT — PAIN - FUNCTIONAL ASSESSMENT: PAIN_FUNCTIONAL_ASSESSMENT: 0-10

## 2025-06-03 NOTE — ED NOTES
Pt given d/c instructions and shoes.  Pt able to climb out of bed and get in wheelchair on own, nurse as standby.  Pt to lobby to await ride home.

## 2025-06-03 NOTE — ED NOTES
Pt sleeping while RN taking vital signs.  Pt rated pain 7/10 before and after pain medications, and when informed pt of that she said her pain was much better.

## 2025-06-04 NOTE — ED PROVIDER NOTES
EMERGENCY DEPARTMENT ENCOUNTER     [facility] EMERGENCY DEPARTMENT     Pt Name: Roxanne Nicholas   MRN: [id number]   Birthdate 1990   Date of evaluation: 6/3/2025   Provider: Javed Noriega MD   PCP: Rosaline Wyatt APRN - NP   Note Started: 11:47 PM EDT 6/3/25     CHIEF COMPLAINT     Chief Complaint   Patient presents with    Leg Pain     Bilat leg pain and edema that has gotten worse since end of May.  Pt seen at [facility], and [facility] for same.          HISTORY OF PRESENT ILLNESS:  History from : Patient        Roxanne Nicholas is a 34 y.o. female who presents for evaluation of bilateral lower extremity swelling.  Patient reports that she is a history of lower extremity edema.  States that her legs been more swollen than normal.  She is discussing discomfort.  Denies shortness of breath chest pains or fevers.  Patient initially stated that she called her cousin and was then brought to the ED for evaluation.  She also reports that she called her physicians who told her to present to the ED for evaluation.  She has been diagnosed with a functional neurologic disorder and does see specialist at [facility].  Patient sees multiple specialists for multiple neurologic complaints typically associated with her legs.  Patient did have an echocardiogram previous year which demonstrated a normal ejection fraction.  States he is not take medications for her symptoms.     Nursing Notes were all reviewed and agreed with or any disagreements were addressed in the HPI.     ROS: Positives and Pertinent negatives as per HPI.    PAST MEDICAL HISTORY     Past medical history:  has a past medical history of Alpha-1-antitrypsin deficiency (HCC), Asthma, Bronchopulmonary dysplasia of  (HCC), Heart murmur, History of gallstones, and PFO (patent foramen ovale).    Past surgical history:  has a past surgical history that includes Cardiac surgery; Tonsillectomy; Adenoidectomy; Cholecystectomy (2011);